# Patient Record
Sex: MALE | Race: BLACK OR AFRICAN AMERICAN | NOT HISPANIC OR LATINO | URBAN - METROPOLITAN AREA
[De-identification: names, ages, dates, MRNs, and addresses within clinical notes are randomized per-mention and may not be internally consistent; named-entity substitution may affect disease eponyms.]

---

## 2024-05-06 DIAGNOSIS — Z09 PSYCHIATRIC FOLLOW-UP: ICD-10-CM

## 2024-05-06 DIAGNOSIS — Z86.59 PSYCHIATRIC FOLLOW-UP: ICD-10-CM

## 2024-05-06 PROBLEM — F63.9 IMPULSE CONTROL DISORDER: Status: ACTIVE | Noted: 2024-05-06

## 2024-05-06 PROBLEM — F79 INTELLECTUAL DISABILITY: Status: ACTIVE | Noted: 2024-05-06

## 2024-05-20 ENCOUNTER — OFFICE VISIT (OUTPATIENT)
Dept: BEHAVIORAL HEALTH | Facility: CLINIC | Age: 24
End: 2024-05-20
Payer: COMMERCIAL

## 2024-05-20 DIAGNOSIS — Z86.59 HISTORY OF MOOD DISORDER: ICD-10-CM

## 2024-05-20 DIAGNOSIS — G47.9 SLEEP DISORDER: ICD-10-CM

## 2024-05-20 DIAGNOSIS — Z86.59 PSYCHIATRIC FOLLOW-UP: ICD-10-CM

## 2024-05-20 DIAGNOSIS — R47.9 DIFFICULTY USING VERBAL COMMUNICATION: ICD-10-CM

## 2024-05-20 DIAGNOSIS — Z01.89 ASSESSMENT OF EFFECTS OF PSYCHOTROPIC DRUG: ICD-10-CM

## 2024-05-20 DIAGNOSIS — T88.7XXA MEDICATION SIDE EFFECTS: ICD-10-CM

## 2024-05-20 DIAGNOSIS — Q90.9 DOWN SYNDROME (HHS-HCC): ICD-10-CM

## 2024-05-20 DIAGNOSIS — Z09 PSYCHIATRIC FOLLOW-UP: ICD-10-CM

## 2024-05-20 DIAGNOSIS — K59.09 CHRONIC CONSTIPATION: ICD-10-CM

## 2024-05-20 DIAGNOSIS — F79 INTELLECTUAL DISABILITY: ICD-10-CM

## 2024-05-20 DIAGNOSIS — R41.89 DIFFICULTY UNDERSTANDING VERBAL LANGUAGE: ICD-10-CM

## 2024-05-20 DIAGNOSIS — F29 ATYPICAL PSYCHOSIS (MULTI): ICD-10-CM

## 2024-05-20 DIAGNOSIS — Z79.899 ASSESSMENT OF EFFECTS OF PSYCHOTROPIC DRUG: ICD-10-CM

## 2024-05-20 DIAGNOSIS — Z91.89 AT RISK FOR SIDE EFFECT OF MEDICATION: ICD-10-CM

## 2024-05-20 PROCEDURE — 99215 OFFICE O/P EST HI 40 MIN: CPT | Performed by: STUDENT IN AN ORGANIZED HEALTH CARE EDUCATION/TRAINING PROGRAM

## 2024-05-20 NOTE — PATIENT INSTRUCTIONS
AFTER VISIT SUMMARY      Date: 5/20/2024  Appointment with Psychiatrist - Dr. Freedman      Reason for Visit:  -Routine follow-up/Medication management      Discussed during Appointment:   -Constipation  -Physical health, psychiatric/behavioral symptoms, daily functioning, new issues/concerns     -Treatment plan/management, need for yearly bloodwork  -Medication      Clinical Impression/Status Update:  Patient appears to be at his psychiatric and behavioral baseline. Enjoying day program. However, mother reports baseline constipation. Reports of frequent vomiting about 5 to 10 minutes after getting ducolax. Recommended that mother changes the formulation or get a different flavor. Recent combination for constipation is not optimal, therefore will increasing docusate from 100 mg daily to 100 mg bid. Will order yearly labs and follow up in 6 months.      #Clinic Policies/Procedures  -Refills  Prescriptions will be sent to your pharmacy with enough refills to last until your next appointment. For established patients, we typically provide 6 refills for regular meds and 3 refills for controlled substances (e.g., ADHD stimulant medication, benzodiazepines such as lorazepam). We will not provide additional refills beyond that without having an appointment. You can schedule an appointment by calling our office at [phone].     -Paperwork Requests (e.g., Expert Eval for guardianship)  We ask that you please schedule an appointment if needing paperwork filled out by the doctor (e.g., Expert Eval, FMLA form).  Please provide as much information as possible about your request and email the doctor any forms needing to be filled out prior to the appointment.       ===========================  INSTRUCTIONS/RECOMMENDATIONS  ===========================    #Medication   -Medication changes made today:   --We are increasing your docusate from 100 mg daily to 100 mg twice daily    >>For prior authorization issues at the pharmacy  -> Call the office and ask to talk to Nurse Reyes.      ** Include signing up for [employer] and help with technical issues    [employer] - Online Patient Portal   For Help activating your [employer] Account or setting up Proxy Access, please call the dedicated Patient Support Line at [phone].    #Technical Issues with Epic -> Please help us improve the Epic experience  To help identify issues needing to be fixed and improve patient care, please report any issues you experience with Epic or  [employer] such as difficulty connecting to video during virtual visits.  [phone]      #Follow-up  --Your next appointment is scheduled for 10/14/2024 at 3:00pm (virtual visit with [employer])    *If having new/worsening symptoms, please call the clinic ([phone]) to discuss being seen sooner   >>If unable to reach the office, send me an email at [email or web address]

## 2024-05-20 NOTE — PROGRESS NOTES
Others Attending Appointment:  -Mother  *Presence necessary as independent historian given patient's intellectual/developmental disability and/or impaired communication abilities      LAST INTERVENTION  Last seen in August 2023. Patient was at his psychiatric/behavioral baseline. Report of constipation. Reviewed constipation meds administration by mother. Recommended Senna and lactulose. Reviewed lab results and discussed Down Syndrome-related complications that can occur including Alzheimer's. No medication changes.      HISTORY OF PRESENT ILLNESS    #Interval Change  -Patient reported to be at about their psychiatric/behavioral baseline  -No new issues/concerns  -Patient attends day program ([facility] [employer]) at [place].         Behavior  -Stable. Behavior has remained at baseline per mother. There is no report of physical aggression, property destruction, elopement, or self-injurious behavior since last visit.   -Recall from prior: patient occasionally yells and cusses at mother. No more than a couple times per month. Mother reports belief that listening to music exacerbates patient’s yelling at unseen things.         -Psychosis  Stable. Rarely appears internally stimulated. Not daily or even weekly. As prior, patient does not endorse hallucinations. There is no evidence suggestive of delusions.       -Medication  -Endorses medication adherence.  -Denies side effects other than baseline constipation  -No report of signs/symptoms suggestive of oculogyric crisis since last visit; has been taking cogentin as a prophylaxis.  -No report of significant issues with excessive sedation, drooling, dizziness, tremors, rigidity, or shuffling gait.  -No report of significant change in frequency of use of PRNs for behaviors/agitation  -No report of significant change in effectiveness of PRNs for mitigating target behavior  -Chronic constipation discussed below      Health  -Mother reports concern for weight gain  though unclear if actual gain and if so how much  --Height: 5 ft. Weight: 125 pounds (5/20/2024)  No report of ED visits or hospitalizations since last visit.   No report of changes or issues with sleep or appetite.     -constipation which is reported to be same as usual. Mother reports that patient throws up about 5 or 10 minutes after she administers Ducolax tablets (about 80% of the time). Mother reported that she tried administering it before breakfast but that didn't help him either. Patient gets the stool softener (docusate) every night and ducolax on Wednesday and Saturday mornings around 8 am and ready to pass stool by the time he gets back from day program. Patient doesn't vomit after taking the stool softener. Other laxatives medications that have been used in the past include miralax.          REVIEW OF SYMPTOMS  -Depression: sometimes irritable   -Anxiety: negative  -ADHD: negative  -ASD: negative  -Psychosis: as per HPI  -ODD: negative  -OCD: negative  -Veda: negative  -Aggression: as per HPI  -SIB: as per HPI  -Sexually inappropriate behaviors: Will often pat females on the buttocks or the waist. Masturbates only in his room.   -Sleep: No issues reported.   -Appetite: No issues reported. No report of pica.   -Medical ROS: does not endorse difficulty urinating, seizures, rash, or polydipsia. +Constipation  *All other systems have been reviewed and are negative for complaint unless otherwise noted above         PAST PSYCHIATRIC HISTORY  -Prior diagnoses: Down Syndrome  -Was previously seeing someone at [facility] in June 2020; prior to that did not get psych care  -No report of prior psych hospitalizations  -History of violence: hitting, pushing, shoving, property destruction  -No reported history of self-injurious behavior  -History of self-injurious behavior: used to hit self in the head years ago  -No reported history of suicide attempt  -Previous psych meds: Abilify (not effective)    SOCIAL HISTORY:    -Lives with mother, father, aunt  -Has 7 siblings, all adult  -Family: significant involvement  -Care needs: 24/7 supervision, has an aide in the evenings to help family, assistance with ADLs, assistance with iADLS  -Work/School: graduated from high school. Goes to a vocational training center daily.  -Receives Board of Developmental Disabilities services in [facility] [place]  -Guardianship: own guardian   -Language/Communication: Poor verbal fluency, vocalizes with unclear meaning, limited vocabulary, poor language comprehension  -Cognitive abilities: No history of formal testing. Cannot read or write. No money management abilities.  -Does not endorse smoking, ETOH, or illicit drug use. No history of past substance abuse.  -No reported history of significant trauma   -No reported access to firearms     PAST MEDICAL HISTORY:   -No reported history of seizures  -No reported history of cardiac issues  -Prenatal exposure to crack cocaine  -Chronic constipation  -Down Syndrome  -Nystagmus  -PCP: Joe Keith MD  -Dentist: [facility]       FAMILY HISTORY  -Patient is adopted from his aunt  -Older brother with schizophrenia vs drug-induced 2/2 crack cocaine  -No reported family history of suicide  -Mother with substance abuse issues          Mental Status Exam:     Appearance: adequate grooming, dysmorphic features consistent with Down Syndrome  Build: average  Eye Contact: average, bilateral nystagmus as prior.   Motor Activity:  average without PMA/PMR.   Musculoskeletal: No TD, tics, or tremor appreciated. AIMS score 0.   Demeanor: average  Mood: euthymic  Affect: restricted  Speech: limited vocab, slow, slurred, hypophonic speech.  Thought Process: [place]. Generally goal directed. Associations are logical.  Thought Content: Does not endorse suicidal or homicidal ideation, no delusions elicited.   Thought Perception: Does not endorse auditory or visual hallucinations. Does not appear to be responding  to hallucinatory stimuli  Orientation: alert, oriented to self/situation  Attention/Concentration: normal  Cognition: moderate to severe intellectual disability  Insight: impaired, in setting of intellectual/developmental disability  Judgement: impaired, in setting of intellectual/developmental disability             RISK ASSESSMENT   Patient is currently felt to be at low acute and imminent risk of harm to self and others. However, chronic risk is elevated given history of violence including aggression towards others. Patient does not currently meet criteria for inpatient psychiatric hospitalization given that he does not exhibit evidence of significant deterioration in baseline psychiatric illness, aggression, self-injury, and ability to care for self. There is no evidence that patient’s current caregivers/living environment are unable to safely manage patient’s behavior. Overall risk mitigated by continued psychiatric follow-up care, psychopharmacologic intervention, 24/7 supervision, and [facility] services. Mother aware of emergency psychiatric resources available in the event of an acute psychiatric emergency, Mobile Crisis, 911, and local ER.      ===========================  IMPRESSION  Male with Down syndrome who initially presented for new eval of angry outbursts.     Initial Eval:  Patient’s outbursts do not appear to be behaviorally driven and there does not seem to be significant irritability involved. The setting in which outbursts occur, when talking out loud to unseen persons (often while alone) is concerning for a psychotic disorder and patient does have a family history of schizophrenia. Will start by increasing Risperidone to target possible psychosis. Mother has been using prn Ativan prescribed by the ED. Instead, will encourage use of Risperidone prn as the outbursts seem to occur in the setting of psychosis. I will provide a limited amount of Ativan for emergencies if Risperidone doesn’t  work.       ###  As of this appointment:  Patient appears to be at his psychiatric and behavioral baseline. No report of interim instances of oculogyric phenomenon. No report of new issues/concerns. Will continue psych meds and check yearly monitoring labs.     Report of vomiting associated with Ducolax and chronic constipation not improved from med recs at last visit. Will try to optimize constipation management with multifactorial approach. Provided recs for non-pharm interventions (e,g. Hydration), trying different formulations/flavoring. Will alsotry increasing docusate from 100 mg daily to 100 mg bid.    ###        Diagnoses:  -Unspecified psychotic disorder, r/o Schizophrenia  -Chronic constipation  -Sleep Disorder  -Mood disorder  -Down Syndrome  -Intellectual Disability      PLAN    *MED CHANGES*  Visit of high complexity given patient’s limited communication ability and intellectual disability, and the resulting need for the presence of a third party (family) to provide clinical information and history.     #Medication management  -Increase:  --Docusate from 100 mg daily to 100 mg bid    Continue:  --Risperidone 2mg bid  --Risperidone 2mg qday prn for agitation/psychosis   --Benztropine 1mg bid   --Fluoxetine 10mg daily   --Melatonin 3mg qhs   --Ativan 1mg qday, prn, as needed for severe agitation not responding to prn Ripseridone  --Valium PRN for labs      #Medication Considerations  -Medication consent/assent:   Risks, benefits, alternatives, off-label uses, black box warnings, and frequent/important side effects of medications have been discussed with patient/caregiver. To the extent possible, they have voiced understanding and agreement with recommended use of psychotropic medication.     -Medical necessity for continued treatment with psychotropic medication:      [] Symptom reduction        [] Improvement in functioning      [x] Reduce risk of harm to self/others      [x] Maintenance therapy to  prevent deterioration in functioning      -Rational for not reducing medication dose at this time:           [x] Significant risk of deterioration in functioning       [x] Concern for elevating risk of harm to self/others      [] Prior dose reduction unsuccessful and/or harmful      [] Psychiatric/behavioral condition not adequately stabilized/optimized       [] Medication regimen recently modified          -OARRS  --I have personally reviewed patient's OARRS report. I have considered the risks of abuse, dependence, addiction, and diversion and feel that the potential benefits of treatment with a controlled substance currently outweigh the potential risks.      -Risk/Benefit assessment:  There is no report of signs/symptoms consistent with medication-induced impairment in daily functioning. At this time, benefits of medication felt to outweigh potential risks. But we will continue to reassess need for psychotropic medication at regular 3 to 6 month intervals, or sooner as clinically indicated.         #Medication Monitoring Plan:    -Labs due May 2024; orders placed   -Labs to monitor: CBC, CMP, TSH/T4, lipid panel, Hgb A1c       #MDM/Complexity Issues    [] Chronic medical comorbidities     [x] Chronic risk of harm to self/others     [x] Intellectual/Developmental disability     [x] Need for independent historian due to intellectual/developmental disability and/or           impaired communication abilities     [x] Controlled substance medication requiring regular monitoring     [x] Medication requiring significant ongoing monitoring for toxicity     #Counseling Provided     [x] Diagnostic impression/prognosis      [x] Risks and benefits of treatment options     [x] Instruction for management/treatment and follow-up     [x] Educated patient/caregiver about: behavioral interventions, sleep hygiene, safety planning                  #Coordination of care provided    [x] Family    [] Caregiver/DSP/Staff       []Agency  supervisor       [] Nursing staff      [] SSA/          []Therapist                     [] Guardian       #Follow-up      -in about 6 months, or sooner if new/worsening symptoms         >> Scheduled virtual Follow-up Appt on 10/14/2024 at 15:00     Time  -Prep time on date of the patient encounter: 10 minutes  -Time spent directly with patient/family/caregiver: 45 minutes  -Additional time spent on patient care activities: 10 minutes  -Documentation time: 10 minutes  -Total time on date of patient encounter: 75 minutes        Scribe Attestation  By signing my name below, I, Rosalee MccoyJD [facility] , Scribe   attest that this documentation has been prepared under the direction and in the presence of Bonita Freedman DO.

## 2024-05-24 RX ORDER — BENZTROPINE MESYLATE 1 MG/1
1 TABLET ORAL 2 TIMES DAILY
Status: CANCELLED | OUTPATIENT
Start: 2024-05-24

## 2024-05-24 RX ORDER — FLUOXETINE 10 MG/1
10 TABLET ORAL
COMMUNITY
End: 2024-05-28 | Stop reason: SDUPTHER

## 2024-05-24 RX ORDER — DOCUSATE SODIUM 100 MG/1
100 CAPSULE, LIQUID FILLED ORAL DAILY
COMMUNITY
Start: 2024-03-19 | End: 2024-05-28 | Stop reason: SDUPTHER

## 2024-05-24 RX ORDER — DOCUSATE SODIUM 100 MG/1
100 CAPSULE, LIQUID FILLED ORAL DAILY
Qty: 60 CAPSULE | Status: CANCELLED | OUTPATIENT
Start: 2024-05-24

## 2024-05-24 RX ORDER — TALC
POWDER (GRAM) TOPICAL
Status: CANCELLED | OUTPATIENT
Start: 2024-05-24

## 2024-05-24 RX ORDER — LORAZEPAM 1 MG/1
1 TABLET ORAL DAILY PRN
COMMUNITY
Start: 2020-10-04

## 2024-05-24 RX ORDER — FLUOXETINE 10 MG/1
10 TABLET ORAL
Status: CANCELLED | OUTPATIENT
Start: 2024-05-24

## 2024-05-24 RX ORDER — TALC
POWDER (GRAM) TOPICAL
COMMUNITY
End: 2024-05-28 | Stop reason: SDUPTHER

## 2024-05-24 RX ORDER — RISPERIDONE 2 MG/1
TABLET ORAL
Status: CANCELLED | OUTPATIENT
Start: 2024-05-24

## 2024-05-24 RX ORDER — BENZTROPINE MESYLATE 1 MG/1
1 TABLET ORAL 2 TIMES DAILY
COMMUNITY
End: 2024-05-28 | Stop reason: SDUPTHER

## 2024-05-24 RX ORDER — LORAZEPAM 1 MG/1
1 TABLET ORAL DAILY PRN
Status: CANCELLED | OUTPATIENT
Start: 2024-05-24

## 2024-05-24 RX ORDER — RISPERIDONE 2 MG/1
TABLET ORAL
COMMUNITY
End: 2024-05-28 | Stop reason: SDUPTHER

## 2024-05-28 DIAGNOSIS — Z91.89 AT RISK FOR SIDE EFFECT OF MEDICATION: ICD-10-CM

## 2024-05-28 DIAGNOSIS — F29 PSYCHOSIS, UNSPECIFIED PSYCHOSIS TYPE (MULTI): Primary | ICD-10-CM

## 2024-05-28 DIAGNOSIS — G47.9 SLEEP DISORDER: ICD-10-CM

## 2024-05-28 DIAGNOSIS — F63.9 IMPULSE CONTROL DISORDER: ICD-10-CM

## 2024-05-28 RX ORDER — TALC
POWDER (GRAM) TOPICAL
Qty: 30 TABLET | Refills: 6 | Status: SHIPPED | OUTPATIENT
Start: 2024-05-28

## 2024-05-28 RX ORDER — FLUOXETINE 10 MG/1
TABLET ORAL
Qty: 30 TABLET | Refills: 6 | Status: SHIPPED | OUTPATIENT
Start: 2024-05-28

## 2024-05-28 RX ORDER — DOCUSATE SODIUM 100 MG/1
CAPSULE, LIQUID FILLED ORAL
Qty: 60 CAPSULE | Refills: 6 | Status: SHIPPED | OUTPATIENT
Start: 2024-05-28

## 2024-05-28 RX ORDER — BENZTROPINE MESYLATE 1 MG/1
TABLET ORAL
Qty: 60 TABLET | Refills: 6 | Status: SHIPPED | OUTPATIENT
Start: 2024-05-28

## 2024-05-28 RX ORDER — RISPERIDONE 2 MG/1
TABLET ORAL
Qty: 90 TABLET | Refills: 6 | Status: SHIPPED | OUTPATIENT
Start: 2024-05-28

## 2024-05-29 PROBLEM — Z78.9: Status: ACTIVE | Noted: 2024-05-29

## 2024-05-29 PROBLEM — Z01.89 ASSESSMENT OF EFFECTS OF PSYCHOTROPIC DRUG IN PATIENT AT RISK FOR METABOLIC SYNDROME: Status: ACTIVE | Noted: 2024-05-29

## 2024-05-29 PROBLEM — Z79.899 ASSESSMENT OF EFFECTS OF PSYCHOTROPIC DRUG IN PATIENT AT RISK FOR METABOLIC SYNDROME: Status: ACTIVE | Noted: 2024-05-29

## 2024-05-29 PROBLEM — R47.9 DIFFICULTY USING VERBAL COMMUNICATION: Status: ACTIVE | Noted: 2024-05-29

## 2024-05-29 PROBLEM — Z91.89 AT RISK FOR SIDE EFFECT OF MEDICATION: Status: ACTIVE | Noted: 2024-05-29

## 2024-05-29 PROBLEM — R68.89: Status: ACTIVE | Noted: 2024-05-29

## 2024-05-29 PROBLEM — F29 ATYPICAL PSYCHOSIS (MULTI): Status: ACTIVE | Noted: 2024-05-29

## 2024-05-29 PROBLEM — Z86.59 HISTORY OF MOOD DISORDER: Status: ACTIVE | Noted: 2024-05-29

## 2024-05-29 PROBLEM — K59.09 CHRONIC CONSTIPATION: Status: ACTIVE | Noted: 2024-05-29

## 2024-05-29 PROBLEM — T88.7XXA MEDICATION SIDE EFFECTS: Status: ACTIVE | Noted: 2024-05-29

## 2024-05-29 PROBLEM — R41.89 DIFFICULTY UNDERSTANDING VERBAL LANGUAGE: Status: ACTIVE | Noted: 2024-05-29

## 2024-05-29 PROBLEM — G47.9 SLEEP DISORDER: Status: ACTIVE | Noted: 2024-05-29

## 2024-05-29 PROBLEM — Q90.9 DOWN SYNDROME (HHS-HCC): Status: ACTIVE | Noted: 2024-05-29

## 2024-10-11 NOTE — PROGRESS NOTES
Others Attending Appointment:  -Mother  *Presence necessary as independent historian given patient's intellectual/developmental disability and/or impaired communication abilities      LAST INTERVENTION  Last seen about 5 months ago in May 2024. Patient was at his psychiatric/behavioral baseline. Report of constipation. Provided recs for non-pharm interventions (e,g. Hydration) and trying different formulations/flavoring. Increased docusate from 100 mg daily to 100 mg bid.          HISTORY OF PRESENT ILLNESS    #Interval Change  -Report of worse behavioral issues made worse by constipation        -Behavior  -Worse  -Report of recent behavioral outbursts (at day program > home) involving yelling, cussing and throwing stuff. Somewhat morefrequent than baseline,mum reports belief these happen when constipated.   There is no report of physical aggression, property destruction, elopement, or self-injurious behavior since last visit.   -Recall from prior: patient occasionally yells and cusses at mother. No more than a couple times per month.  Mother reports triggers include being told no or told what to do.  Mother reports belief that listening to music exacerbates patient's yelling at unseen things.     -Psychosis  Stable  Rarely appears internally stimulated. Not daily or even weekly. As prior, patient does not endorse hallucinations. There is no evidence suggestive of delusions.     -Medication  -Endorses medication adherence.  -Denies side effects other than baseline constipation  -No report of signs/symptoms suggestive of oculogyric crisis since last visit; has been taking cogentin as a prophylaxis.  -No report of significant issues with excessive sedation, drooling, dizziness, tremors, rigidity, or shuffling gait.  -No report of significant change in frequency of use of PRNs for behaviors/agitation  -No report of significant change in effectiveness of PRNs for mitigating target behavior  -Chronic constipation  "discussed below      Health  Report of constipation. Mother reports that he defecates once every week and she would want him \"to go\" every morning. Mother reports that patient gets the stool softeners twice a day.  No report of ED visits or hospitalizations since last visit.   No report of changes or issues with sleep or appetite.   Recall from prior: Mother reports concern for weight gain though unclear if actual gain and if so how much  --Height: 5 ft. Weight: 125 pounds (5/20/2024)  -Mother reports that patient throws up about 5 or 10 minutes after she administers Ducolax tablets (about 80% of the time). Mother reported that she tried administering it before breakfast but that didn't help him either. Patient gets the stool softener (docusate) every night and ducolax on Wednesday and Saturday mornings around 8 am and ready to pass stool by the time he gets back from day program. Patient doesn't vomit after taking the stool softener. Other laxatives medications that have been used in the past include miralax.      REVIEW OF SYMPTOMS  -Depression: sometimes irritable   -Anxiety: negative  -ADHD: negative  -ASD: negative  -Psychosis: as per HPI  -ODD: negative  -OCD: negative  -Veda: negative  -Aggression: as per HPI  -SIB: as per HPI  -Sexually inappropriate behaviors: Will often pat females on the buttocks or the waist. Masturbates only in his room.   -Sleep: No issues reported.   -Appetite: No issues reported. No report of pica.   -Medical ROS: does not endorse difficulty urinating, seizures, rash, or polydipsia. +Constipation  *All other systems have been reviewed and are negative for complaint unless otherwise noted above         PAST PSYCHIATRIC HISTORY  -Prior diagnoses: Down Syndrome  -Was previously seeing someone at [facility] in June 2020; prior to that did not get psych care  -No report of prior psych hospitalizations  -History of violence: hitting, pushing, shoving, property destruction  -No reported " history of self-injurious behavior  -History of self-injurious behavior: used to hit self in the head years ago  -No reported history of suicide attempt  -Previous psych meds: Abilify (not effective)    SOCIAL HISTORY:   -Lives with mother, father, aunt  -Has 7 siblings, all adult  -Family: significant involvement  -Care needs: 24/7 supervision, has an aide in the evenings to help family, assistance with ADLs, assistance with iADLS  -Work/School: graduated from high school. Goes to a vocational training center daily.  -Receives Board of Developmental Disabilities services in [facility] [place]  -Guardianship: own guardian   -Language/Communication: Poor verbal fluency, vocalizes with unclear meaning, limited vocabulary, poor language comprehension  -Cognitive abilities: No history of formal testing. Cannot read or write. No money management abilities.  -Does not endorse smoking, ETOH, or illicit drug use. No history of past substance abuse.  -No reported history of significant trauma   -No reported access to firearms     PAST MEDICAL HISTORY:   -No reported history of seizures  -No reported history of cardiac issues  -Prenatal exposure to crack cocaine  -Chronic constipation  -Down Syndrome  -Nystagmus  -PCP: Joe Keith MD  -Dentist: [facility]       FAMILY HISTORY  -Patient is adopted from his aunt  -Older brother with schizophrenia vs drug-induced 2/2 crack cocaine  -No reported family history of suicide  -Mother with substance abuse issues      Mental Status Exam:     Appearance: adequate grooming, dysmorphic features consistent with Down Syndrome  Build: average  Eye Contact: average, bilateral nystagmus as prior.   Motor Activity:  average without PMA/PMR.   Musculoskeletal: No TD, tics, or tremor appreciated. AIMS score 0.   Demeanor: average  Mood: euthymic  Affect: restricted  Speech: limited vocab, slow, slurred, hypophonic speech.  Thought Process: [place]. Generally goal directed.  Associations are logical.  Thought Content: Does not endorse suicidal or homicidal ideation, no delusions elicited.   Thought Perception: Does not endorse auditory or visual hallucinations. Does not appear to be responding to hallucinatory stimuli  Orientation: alert, oriented to self/situation  Attention/Concentration: normal  Cognition: moderate to severe intellectual disability  Insight: impaired, in setting of intellectual/developmental disability  Judgement: impaired, in setting of intellectual/developmental disability       RISK ASSESSMENT   Patient is currently felt to be at low acute and imminent risk of harm to self and others. However, chronic risk is elevated given history of violence including aggression towards others. Patient does not currently meet criteria for inpatient psychiatric hospitalization given that he does not exhibit evidence of significant deterioration in baseline psychiatric illness, aggression, self-injury, and ability to care for self. There is no evidence that patient's current caregivers/living environment are unable to safely manage patient's behavior. Overall risk mitigated by continued psychiatric follow-up care, psychopharmacologic intervention, 24/7 supervision, and [facility] services. Mother aware of emergency psychiatric resources available in the event of an acute psychiatric emergency, Mobile Crisis, 911, and local ER.      ===========================  IMPRESSION  Male with Down syndrome who initially presented for new eval of angry outbursts.     Initial Eval:  Patient's outbursts do not appear to be behaviorally driven and there does not seem to be significant irritability involved. The setting in which outbursts occur, when talking out loud to unseen persons (often while alone) is concerning for a psychotic disorder and patient does have a family history of schizophrenia. Will start by increasing Risperidone to target possible psychosis. Mother has been using prn  Ativan prescribed by the ED. Instead, will encourage use of Risperidone prn as the outbursts seem to occur in the setting of psychosis. I will provide a limited amount of Ativan for emergencies if Risperidone doesn't work.       ###  AS OF THIS APPOINTMENT:  -Patient's overall psychiatric and behavioral condition worse since last visit, notably report of worse behavioral issues that mum notes seem to be made worse by constipation. Discussed behavioral strategies in addition to plan for addressing chronic constipation.   -Otherwise, no report of significant new issues/concerns    -Patient due for labs. Labs ordered during prior visit. . Will continue to remind caregiver to get labs done.   -Patient appears to be tolerating medication regimen without report of significant new or bothersome side effects aside from chronic constipation,  -Will start daily scheduled lactulose for constipation.  --Additionally, discussed a number of strategies and apps for mum to use to improve constipation  --Mother requested renewing PRN Ativan. Discussed risk and benefits of using benzodiazepines and that medication is to be used in emergency situations only.   -Will plan for follow-up in about 4 to 6 weeks   ###      Diagnoses:  -Unspecified psychotic disorder, r/o Schizophrenia  -Chronic constipation  -Sleep Disorder  -Mood disorder  -Down Syndrome  -Intellectual Disability      PLAN / MANAGEMENT / RECOMMENDATIONS        >>MED CHANGES<<     Visit of high complexity given patient's limited communication ability and intellectual disability, and the resulting need for the presence of a third party (family) to provide clinical information and history.     #Medication management  -Start scheduled Lactulose 15 ml daily  -Renew PRN Ativan 1 mg prn for agitation  -Continue:  --Risperidone 2mg bid  --Risperidone 2mg qday prn for agitation/psychosis   --Benztropine 1mg bid   --Fluoxetine 10mg daily   --Melatonin 3mg qhs   --Valium PRN for  labs  --Docusate 100 mg bid      #Medication Considerations  -Medication consent/assent:   Risks, benefits, alternatives, off-label uses, black box warnings, and frequent/important side effects of medications have been discussed with patient/caregiver. To the extent possible, they have voiced understanding and agreement with recommended use of psychotropic medication.     -Medical necessity for continued treatment with psychotropic medication:      [] Symptom reduction        [] Improvement in functioning      [x] Reduce risk of harm to self/others      [x] Maintenance therapy to prevent deterioration in functioning      -Rational for not reducing medication dose at this time:           [x] Significant risk of deterioration in functioning       [x] Concern for elevating risk of harm to self/others      [] Prior dose reduction unsuccessful and/or harmful      [] Psychiatric/behavioral condition not adequately stabilized/optimized       [] Medication regimen recently modified          -OARRS  --I have personally reviewed patient's OARRS report. I have considered the risks of abuse, dependence, addiction, and diversion and feel that the potential benefits of treatment with a controlled substance currently outweigh the potential risks.      -Risk/Benefit assessment:  There is no report of signs/symptoms consistent with medication-induced impairment in daily functioning. At this time, benefits of medication felt to outweigh potential risks. But we will continue to reassess need for psychotropic medication at regular 3 to 6 month intervals, or sooner as clinically indicated.         #Medication Monitoring Plan:    -Labs due May 2024; orders placed   -Labs to monitor: CBC, CMP, TSH/T4, lipid panel, Hgb A1c       #MDM/Complexity Issues    [] Chronic medical comorbidities     [x] Chronic risk of harm to self/others     [x] Intellectual/Developmental disability     [x] Need for independent historian due to  intellectual/developmental disability and/or           impaired communication abilities     [x] Controlled substance medication requiring regular monitoring     [x] Medication requiring significant ongoing monitoring for toxicity     #Counseling Provided     [x] Diagnostic impression/prognosis      [x] Risks and benefits of treatment options     [x] Instruction for management/treatment and follow-up     [x] Educated patient/caregiver about: behavioral interventions, sleep hygiene, safety planning                  #Coordination of care provided    [x] Family    [] Caregiver/DSP/Staff       []Agency supervisor       [] Nursing staff      [] SSA/          []Therapist                     [] Guardian       #Follow-up      -in about 4 to 6 weeks, or sooner if new/worsening symptoms         >> Scheduled virtual Follow-up Appt on 11/12/2024 at 16:00     Time  -Prep time on date of the patient encounter: 10 minutes  -Time spent directly with patient/family/caregiver: 45 minutes  -Additional time spent on patient care activities: 10 minutes  -Documentation time: 10 minutes  -Total time on date of patient encounter: 75 minutes        Scribe Attestation  By signing my name below, IRosalee, Scribe   attest that this documentation has been prepared under the direction and in the presence of Bonita Freedman DO.

## 2024-10-14 ENCOUNTER — APPOINTMENT (OUTPATIENT)
Dept: BEHAVIORAL HEALTH | Facility: CLINIC | Age: 24
End: 2024-10-14
Payer: COMMERCIAL

## 2024-10-14 DIAGNOSIS — Z86.59 PSYCHIATRIC FOLLOW-UP: ICD-10-CM

## 2024-10-14 DIAGNOSIS — Z09 PSYCHIATRIC FOLLOW-UP: ICD-10-CM

## 2024-10-14 DIAGNOSIS — Q90.9 DOWN SYNDROME: ICD-10-CM

## 2024-10-14 DIAGNOSIS — F51.05 INSOMNIA RELATED TO ANOTHER MENTAL DISORDER: ICD-10-CM

## 2024-10-14 DIAGNOSIS — F63.9 IMPULSE CONTROL DISORDER: ICD-10-CM

## 2024-10-14 DIAGNOSIS — F79 INTELLECTUAL DISABILITY: ICD-10-CM

## 2024-10-14 DIAGNOSIS — Z91.89 AT RISK FOR COPING DIFFICULTY: ICD-10-CM

## 2024-10-14 DIAGNOSIS — R45.1 AGITATION: ICD-10-CM

## 2024-10-14 DIAGNOSIS — R47.9 DIFFICULTY USING VERBAL COMMUNICATION: ICD-10-CM

## 2024-10-14 DIAGNOSIS — F29 ATYPICAL PSYCHOSIS: ICD-10-CM

## 2024-10-14 DIAGNOSIS — R41.89 DIFFICULTY UNDERSTANDING VERBAL LANGUAGE: ICD-10-CM

## 2024-10-14 DIAGNOSIS — K59.09 CHRONIC CONSTIPATION: ICD-10-CM

## 2024-10-14 DIAGNOSIS — F39 MOOD DISORDER (CMS-HCC): ICD-10-CM

## 2024-10-14 PROCEDURE — 99215 OFFICE O/P EST HI 40 MIN: CPT | Performed by: STUDENT IN AN ORGANIZED HEALTH CARE EDUCATION/TRAINING PROGRAM

## 2024-10-14 NOTE — PATIENT INSTRUCTIONS
AFTER VISIT SUMMARY      Date: 10/14/2024  Appointment with Psychiatrist - Dr. Freedman      Reason for Visit:  -Routine follow-up/Medication management      Discussed during Appointment:   -Physical health, psychiatric/behavioral symptoms, daily functioning, new issues/concerns     -Treatment plan/management, including medication        Clinical Impression/Status Update:  Challenging behavior reported usually in response to being told what to do or being told no. Discussed behavioral strategies such as giving warning/setting timers for transitions at bed time for instance. Mother requested Ativan which he hasn't been on for over a year. Discussed risk and benefits of using benzodiazepines and that medication is to be used in emergency situations only.   Patient reportedly having only one bowel movement per week. Encouraged mom to download the Happy poop anton to track bowel movement and set aside a time of day for consistent timing for pooping. Will start lactulose.   Patient due for labs. Labs have been ordered. Will continue to remind caregiver to get labs done.   --We will continue current medications and see him again in 1 month.      #Clinic Policies/Procedures  -Refills  Prescriptions will be sent to your pharmacy with enough refills to last until your next appointment. For established patients, we typically provide 6 refills for regular meds and 3 refills for controlled substances (e.g., ADHD stimulant medication, benzodiazepines such as lorazepam). We will not provide additional refills beyond that without having an appointment. You can schedule an appointment by sending a [employer] message or calling our office at [phone].     -Paperwork Requests (e.g., Expert Eval for guardianship)  We ask that you please schedule an appointment if needing paperwork filled out by the doctor (e.g., Expert Eval, FMLA form).  Please provide as much information as possible about your request and email the doctor any forms  needing to be filled out prior to the appointment.       ===========================  INSTRUCTIONS/RECOMMENDATIONS  ===========================    #Medication   -Medication changes made today:   --We are starting you on lactulose 15 ml daily  --We are restarting you on Ativan 1 mg for severe agitation. Take half a tablet, if ineffective after 45 minutes, use another half.      >>For prior authorization issues at the pharmacy -> Call the office and ask to talk to Nurse Reyes.         [employer] - Online Patient Portal   For Help activating your [employer] Account or setting up Proxy Access, please call the dedicated Patient Support Line at [phone].    If having technical Issues with Epic  or [employer]-> Please help us improve the Epic experience  To help identify issues needing to be fixed and improve patient care, please report any issues you experience with Epic or  [employer], such as difficulty connecting to video during virtual visits.  [phone]      #Follow-up  --Your next appointment is scheduled for 11/12/2024 at 4:00 pm (virtual visit with [employer])    *If having new/worsening symptoms, please send a [employer] message or call the clinic ([phone]) to discuss being seen sooner   >>If unable to reach the office, send me an email at [email or web address]

## 2024-10-17 ENCOUNTER — TELEPHONE (OUTPATIENT)
Dept: BEHAVIORAL HEALTH | Facility: CLINIC | Age: 24
End: 2024-10-17
Payer: COMMERCIAL

## 2024-11-12 ENCOUNTER — APPOINTMENT (OUTPATIENT)
Dept: BEHAVIORAL HEALTH | Facility: CLINIC | Age: 24
End: 2024-11-12
Payer: COMMERCIAL

## 2024-11-12 DIAGNOSIS — R47.9 DIFFICULTY USING VERBAL COMMUNICATION: ICD-10-CM

## 2024-11-12 DIAGNOSIS — T50.905A DRUG-INDUCED EXTRAPYRAMIDAL MOVEMENT DISORDER: ICD-10-CM

## 2024-11-12 DIAGNOSIS — F79 INTELLECTUAL DISABILITY: ICD-10-CM

## 2024-11-12 DIAGNOSIS — Z86.59 PSYCHIATRIC FOLLOW-UP: ICD-10-CM

## 2024-11-12 DIAGNOSIS — G25.89 DRUG-INDUCED EXTRAPYRAMIDAL MOVEMENT DISORDER: ICD-10-CM

## 2024-11-12 DIAGNOSIS — Z91.89 AT RISK FOR SIDE EFFECT OF MEDICATION: ICD-10-CM

## 2024-11-12 DIAGNOSIS — Z09 PSYCHIATRIC FOLLOW-UP: ICD-10-CM

## 2024-11-12 DIAGNOSIS — F63.9 IMPULSE CONTROL DISORDER: ICD-10-CM

## 2024-11-12 DIAGNOSIS — Q90.9 DOWN SYNDROME: ICD-10-CM

## 2024-11-12 DIAGNOSIS — F51.05 INSOMNIA RELATED TO ANOTHER MENTAL DISORDER: ICD-10-CM

## 2024-11-12 DIAGNOSIS — F39 MOOD DISORDER (CMS-HCC): ICD-10-CM

## 2024-11-12 DIAGNOSIS — F29 ATYPICAL PSYCHOSIS (MULTI): ICD-10-CM

## 2024-11-12 DIAGNOSIS — K59.09 CHRONIC CONSTIPATION: ICD-10-CM

## 2024-11-12 DIAGNOSIS — R45.1 AGITATION: ICD-10-CM

## 2024-11-12 DIAGNOSIS — R41.89 DIFFICULTY UNDERSTANDING VERBAL LANGUAGE: ICD-10-CM

## 2024-11-12 DIAGNOSIS — Z91.89 AT RISK FOR COPING DIFFICULTY: ICD-10-CM

## 2024-11-12 PROCEDURE — 99215 OFFICE O/P EST HI 40 MIN: CPT | Performed by: STUDENT IN AN ORGANIZED HEALTH CARE EDUCATION/TRAINING PROGRAM

## 2024-11-12 PROCEDURE — 1036F TOBACCO NON-USER: CPT | Performed by: STUDENT IN AN ORGANIZED HEALTH CARE EDUCATION/TRAINING PROGRAM

## 2024-11-12 NOTE — PROGRESS NOTES
Others Attending Appointment:  -Mother  *Presence necessary as independent historian given patient's intellectual/developmental disability and/or impaired communication abilities      LAST INTERVENTION  Last seen about 4 weeks ago in October 2024. Report of challenging behavior. Discussed behavioral strategies to manage behavior. Mother requested Ativan. Discussed risk and benefits of using benzodiazepines and that medication is to be used in emergency situations only. Patient reportedly having only one bowel movement per week. Encouraged mom to download the Happy poop anton to track bowel movement and set aside a time of day for consistent timing for pooping. Patient was started on lactulose 15 ml daily. Labs were ordered.       HISTORY OF PRESENT ILLNESS  #Interval Change  -Report of challenging behavior  -Report of persistent constipation. Mom wants to time patient's bowel movement to 6 pm    -Behavior  Stable. Report of some behavioral outbursts and talking to imaginary people. Mother reports that she knows when patient is about to have an outburst because his lips will be moving with no sound. Mother reports that he is not physically aggressive.  There is no report of physical aggression, property destruction, elopement, or self-injurious behavior since last visit.   Recall from prior: Report of behavioral outbursts at day program than at home. Patient reportedly yelling, cussing and throwing stuff. Mother reports triggers include being told no or told what to do. An example is being told to go take a shower or go to bed.  -Recall from prior: patient occasionally yells and cusses at mother. No more than a couple times per month. Mother reports belief that listening to music exacerbates patient's yelling at unseen things.     -Psychosis  Stable  Rarely appears internally stimulated. Not daily or even weekly. As prior, patient does not endorse hallucinations. There is no evidence suggestive of delusions.  "    -Medication  -Endorses medication adherence.  -Denies side effects other than baseline constipation  -No report of signs/symptoms suggestive of oculogyric crisis since last visit; has been taking cogentin as a prophylaxis.  -No report of significant issues with excessive sedation, drooling, dizziness, tremors, rigidity, or shuffling gait.  -No report of significant change in frequency of use of PRNs for behaviors/agitation  -No report of significant change in effectiveness of PRNs for mitigating target behavior  -Chronic constipation discussed below  *Mother administers Dulcolax pills (3 pills) as needed. Patient reportedly throws up within 30 minutes. Docusate (100 mg) is given twice a day.    Health  Report of persistent constipation and has only pooped twice since last visit. Patient reportedly does not take flavored drinks.  No report of ED visits or hospitalizations since last visit.   No report of changes or issues with sleep or appetite.   Recall from prior: Report of constipation. Mother reports that he defecates once every week and she would want him \"to go\" every morning. Mother reports that patient gets the stool softeners twice a day.  Recall from prior: Mother reports concern for weight gain though unclear if actual gain and if so how much  --Height: 5 ft. Weight: 125 pounds (5/20/2024)  -constipation which is reported to be same as usual. Mother reports that patient throws up about 5 or 10 minutes after she administers Ducolax tablets (about 80% of the time). Mother reported that she tried administering it before breakfast but that didn't help him either. Patient gets the stool softener (docusate) every night and ducolax on Wednesday and Saturday mornings around 8 am and ready to pass stool by the time he gets back from day program. Patient doesn't vomit after taking the stool softener. Other laxatives medications that have been used in the past include miralax.      REVIEW OF " SYMPTOMS  -Depression: sometimes irritable   -Anxiety: negative  -ADHD: negative  -ASD: negative  -Psychosis: as per HPI  -ODD: negative  -OCD: negative  -Veda: negative  -Aggression: as per HPI  -SIB: as per HPI  -Sexually inappropriate behaviors: Will often pat females on the buttocks or the waist. Masturbates only in his room.   -Sleep: No issues reported.   -Appetite: No issues reported. No report of pica.   -Medical ROS: does not endorse difficulty urinating, seizures, rash, or polydipsia. +Constipation  *All other systems have been reviewed and are negative for complaint unless otherwise noted above         PAST PSYCHIATRIC HISTORY  -Prior diagnoses: Down Syndrome  -Was previously seeing someone at [facility] in June 2020; prior to that did not get psych care  -No report of prior psych hospitalizations  -History of violence: hitting, pushing, shoving, property destruction  -No reported history of self-injurious behavior  -History of self-injurious behavior: used to hit self in the head years ago  -No reported history of suicide attempt  -Previous psych meds: Abilify (not effective)    SOCIAL HISTORY:   -Lives with mother, father, aunt  -Has 7 siblings, all adult  -Family: significant involvement  -Care needs: 24/7 supervision, has an aide in the evenings to help family, assistance with ADLs, assistance with iADLS  -Work/School: graduated from high school. Goes to a vocational training center daily.  -Receives Board of Developmental Disabilities services in [facility]  -Guardianship: own guardian   -Language/Communication: Poor verbal fluency, vocalizes with unclear meaning, limited vocabulary, poor language comprehension  -Cognitive abilities: No history of formal testing. Cannot read or write. No money management abilities.  -Does not endorse smoking, ETOH, or illicit drug use. No history of past substance abuse.  -No reported history of significant trauma   -No reported access to  firearms     PAST MEDICAL HISTORY:   -No reported history of seizures  -No reported history of cardiac issues  -Prenatal exposure to crack cocaine  -Chronic constipation  -Down Syndrome  -Nystagmus  -PCP: Joe Keith MD  -Dentist: [facility]       FAMILY HISTORY  -Patient is adopted from his aunt  -Older brother with schizophrenia vs drug-induced 2/2 crack cocaine  -No reported family history of suicide  -Mother with substance abuse issues      Mental Status Exam:     Appearance: adequate grooming, dysmorphic features consistent with Down Syndrome  Build: average  Eye Contact: average, bilateral nystagmus as prior.   Motor Activity:  average without PMA/PMR.   Musculoskeletal: No TD, tics, or tremor appreciated. AIMS score 0.   Demeanor: average  Mood: euthymic  Affect: restricted  Speech: limited vocab, slow, slurred, hypophonic speech.  Thought Process: [place]. Generally goal directed. Associations are logical.  Thought Content: Does not endorse suicidal or homicidal ideation, no delusions elicited.   Thought Perception: Does not endorse auditory or visual hallucinations. Does not appear to be responding to hallucinatory stimuli  Orientation: alert, oriented to self/situation  Attention/Concentration: normal  Cognition: moderate to severe intellectual disability  Insight: impaired, in setting of intellectual/developmental disability  Judgement: impaired, in setting of intellectual/developmental disability       RISK ASSESSMENT   Patient is currently felt to be at low acute and imminent risk of harm to self and others. However, chronic risk is elevated given history of violence including aggression towards others. Patient does not currently meet criteria for inpatient psychiatric hospitalization given that he does not exhibit evidence of significant deterioration in baseline psychiatric illness, aggression, self-injury, and ability to care for self. There is no evidence that patient's current caregivers/living  "environment are unable to safely manage patient's behavior. Overall risk mitigated by continued psychiatric follow-up care, psychopharmacologic intervention, 24/7 supervision, and [facility] [place]. Mother aware of emergency psychiatric resources available in the event of an acute psychiatric emergency, Mobile Crisis, 911, and local ER.      ===========================  IMPRESSION  Male with Down syndrome who initially presented for new eval of angry outbursts.     Initial Eval:  Patient's outbursts do not appear to be behaviorally driven and there does not seem to be significant irritability involved. The setting in which outbursts occur, when talking out loud to unseen persons (often while alone) is concerning for a psychotic disorder and patient does have a family history of schizophrenia. Will start by increasing Risperidone to target possible psychosis. Mother has been using prn Ativan prescribed by the ED. Instead, will encourage use of Risperidone prn as the outbursts seem to occur in the setting of psychosis. I will provide a limited amount of Ativan for emergencies if Risperidone doesn't work.       ###      As of this appointment:  -Patient appears to have remained psychiatrically and behaviorally stable since last visit.   -Report of ongoing chronic constipation, unimproved with scheduled bid docusate and mother's unclear report of prn use of \"ducolax\" verified to consist of bisacodyl. As prior, behavioral outbursts appear more likely when significantly constipated. -Otherwise, no report of significant new issues/concerns.   -Recommended additional strategies such as increasing hydration, Happy Poop Tessa, trying to sit on the toilet around same time daily for at least ~10 minutes even when patient not feeling like needing to go in order to promote more consistent bowel habits. Recommended mum stop Ducolax (bisacodyl) for now and will try adding daily scheduled Lactulose instead.   -Will continue rest " of meds unchanged given tolerating medication regimen without report of significant or bothersome side effects other than chronic constipation which is likely in part medication-related.  -Reminded parent that patient due for yearly monitoring labs. Discussed rational and need for fasting labs.   -Will plan to follow up in 1 month.  ###        Diagnoses:  -Unspecified psychotic disorder, r/o Schizophrenia  -Chronic constipation  -Sleep Disorder  -Mood disorder  -Down Syndrome  -Intellectual Disability      PLAN / MANAGEMENT / RECOMMENDATIONS        >>MED CHANGES<<  Visit of high complexity given patient's limited communication ability and intellectual disability, and the resulting need for the presence of a third party (family) to provide clinical information and history.     #Medication management  -Discontinue OTC bisacodyl   Start Lactulose 15 ml scheduled once daily with an [id number] as prn  -Will renew Ativan 1mg occasionally used occasionally for PRN severe agitation.     -Continue:   --Risperidone 2mg bid  --Risperidone 2mg qday prn for agitation/psychosis   --Benztropine 1mg bid   --Fluoxetine 10mg daily   --Melatonin 3mg qhs   --Valium PRN for labs  --Docusate 100 mg bid      #Medication Considerations  -Medication consent/assent:   Risks, benefits, alternatives, off-label uses, black box warnings, and frequent/important side effects of medications have been discussed with patient/caregiver. To the extent possible, they have voiced understanding and agreement with recommended use of psychotropic medication.     -Medical necessity for continued treatment with psychotropic medication:      [] Symptom reduction        [] Improvement in functioning      [x] Reduce risk of harm to self/others      [x] Maintenance therapy to prevent deterioration in functioning      -Rational for not reducing medication dose at this time:           [x] Significant risk of deterioration in functioning       [x] Concern for  elevating risk of harm to self/others      [] Prior dose reduction unsuccessful and/or harmful      [] Psychiatric/behavioral condition not adequately stabilized/optimized       [] Medication regimen recently modified          -OARRS  --I have personally reviewed patient's OARRS report. I have considered the risks of abuse, dependence, addiction, and diversion and feel that the potential benefits of treatment with a controlled substance currently outweigh the potential risks.      -Risk/Benefit assessment:  There is no report of signs/symptoms consistent with medication-induced impairment in daily functioning. At this time, benefits of medication felt to outweigh potential risks. But we will continue to reassess need for psychotropic medication at regular 3 to 6 month intervals, or sooner as clinically indicated.         #Medication Monitoring Plan:    -Labs due May 2024; orders placed   -Labs to monitor: CBC, CMP, TSH/T4, lipid panel, Hgb A1c       #MDM/Complexity Issues    [] Chronic medical comorbidities     [x] Chronic risk of harm to self/others     [x] Intellectual/Developmental disability     [x] Need for independent historian due to intellectual/developmental disability and/or           impaired communication abilities     [x] Controlled substance medication requiring regular monitoring     [x] Medication requiring significant ongoing monitoring for toxicity     #Counseling Provided     [x] Diagnostic impression/prognosis      [x] Risks and benefits of treatment options     [x] Instruction for management/treatment and follow-up     [x] Educated patient/caregiver about: behavioral interventions, sleep hygiene, safety planning                  #Coordination of care provided    [x] Family    [] Caregiver/DSP/Staff       []Agency supervisor       [] Nursing staff      [] SSA/          []Therapist                     [] Guardian       #Follow-up      -in about 4 to 6 weeks, or sooner if new/worsening  symptoms         >> Scheduled virtual Follow-up Appt on 12/23/2024 at 14:00     Time  -Prep time on date of the patient encounter: 10 minutes  -Time spent directly with patient/family/caregiver: 45 minutes  -Additional time spent on patient care activities: 10 minutes  -Documentation time: 10 minutes  -Total time on date of patient encounter: 75 minutes        Scribe Attestation  By signing my name below, I, Rosalee Ambriz, David   attest that this documentation has been prepared under the direction and in the presence of Bonita Freedman DO.

## 2024-11-13 NOTE — PATIENT INSTRUCTIONS
AFTER VISIT SUMMARY      Date: 11/12/2024  Appointment with Psychiatrist - Dr. Freedman      Reason for Visit:  -Routine follow-up/Medication management      Discussed during Appointment:   -Physical health, psychiatric/behavioral symptoms, daily functioning, new issues/concerns     -Treatment plan/management, including medication        Clinical Impression/Status Update:  Report of persistent constipation. Recommended strategies such as drinking more fluids including flavored drinks. Mother reports she wants patient to attempt stooling at 6 pm everyday. Recommended patient sitting on the toilet bowl for at least 15 minutes. Patient to take lactulose 15 ml in the morning and to continue docusate twice daily to see if it helps. Patient to discontinue bisacodyl for now. Encouraged her to download the happy poop anton to track.  Mother notes that he has been having frequent outbursts lately. Could be due to constipation. Mother reports that she does not know his triggers.  Patient due for labs. Labs have been ordered. Will continue to remind caregiver to get labs done.   --We will continue current medications and see him again in 1 month.      #Clinic Policies/Procedures  -Refills  Prescriptions will be sent to your pharmacy with enough refills to last until your next appointment. For established patients, we typically provide 6 refills for regular meds and 3 refills for controlled substances (e.g., ADHD stimulant medication, benzodiazepines such as lorazepam). We will not provide additional refills beyond that without having an appointment. You can schedule an appointment by sending a [employer] message or calling our office at [phone].     -Paperwork Requests (e.g., Expert Eval for guardianship)  We ask that you please schedule an appointment if needing paperwork filled out by the doctor (e.g., Expert Eval, FMLA form).  Please provide as much information as possible about your request and email the doctor any forms  needing to be filled out prior to the appointment.       ===========================  INSTRUCTIONS/RECOMMENDATIONS  ===========================    #Medication   -Medication changes made today:   --We are starting you on lactulose 15 ml daily  --We are restarting you on Ativan 1 mg for severe agitation. Take half a tablet, if ineffective after 45 minutes, use another half.    --Discontinue bisacodyl    >>For prior authorization issues at the pharmacy -> Call the office and ask to talk to Nurse Reyes.         [employer] - Online Patient Portal   For Help activating your [employer] Account or setting up Proxy Access, please call the dedicated Patient Support Line at [phone].    If having technical Issues with Epic  or [employer]-> Please help us improve the Epic experience  To help identify issues needing to be fixed and improve patient care, please report any issues you experience with Epic or  [employer], such as difficulty connecting to video during virtual visits.  [phone]      #Follow-up  --Your next appointment is scheduled for 12/23/2024 at 2:00 pm (virtual visit with [facility])    *If having new/worsening symptoms, please send a [employer] message or call the clinic ([phone]) to discuss being seen sooner   >>If unable to reach the office, send me an email at [email or web address]

## 2024-11-14 PROBLEM — G24.02 DRUG-INDUCED DYSTONIA: Status: ACTIVE | Noted: 2024-11-14

## 2024-11-14 PROBLEM — T50.905A DRUG-INDUCED EXTRAPYRAMIDAL MOVEMENT DISORDER: Status: ACTIVE | Noted: 2024-11-14

## 2024-11-14 PROBLEM — G25.89 DRUG-INDUCED EXTRAPYRAMIDAL MOVEMENT DISORDER: Status: ACTIVE | Noted: 2024-11-14

## 2024-11-19 ENCOUNTER — TELEPHONE (OUTPATIENT)
Dept: BEHAVIORAL HEALTH | Facility: CLINIC | Age: 24
End: 2024-11-19
Payer: COMMERCIAL

## 2024-11-19 RX ORDER — LACTULOSE 10 G/15ML
SOLUTION ORAL
Qty: 450 ML | Refills: 6 | Status: CANCELLED | OUTPATIENT
Start: 2024-11-19

## 2024-11-19 RX ORDER — LORAZEPAM 1 MG/1
1 TABLET ORAL DAILY PRN
Status: CANCELLED | OUTPATIENT
Start: 2024-11-19

## 2024-11-20 RX ORDER — LORAZEPAM 1 MG/1
TABLET ORAL
Qty: 40 TABLET | Refills: 1 | Status: SHIPPED | OUTPATIENT
Start: 2024-11-20

## 2024-11-20 RX ORDER — LACTULOSE 10 G/15ML
SOLUTION ORAL
Qty: 946 ML | Refills: 6 | Status: SHIPPED | OUTPATIENT
Start: 2024-11-20

## 2024-11-20 RX ORDER — RISPERIDONE 2 MG/1
TABLET ORAL
Qty: 90 TABLET | Refills: 6 | Status: SHIPPED | OUTPATIENT
Start: 2024-11-20

## 2024-11-20 RX ORDER — FLUOXETINE 10 MG/1
TABLET ORAL
Qty: 30 TABLET | Refills: 6 | Status: SHIPPED | OUTPATIENT
Start: 2024-11-20

## 2024-11-20 RX ORDER — TALC
POWDER (GRAM) TOPICAL
Qty: 30 TABLET | Refills: 6 | Status: SHIPPED | OUTPATIENT
Start: 2024-11-20

## 2024-11-20 RX ORDER — DOCUSATE SODIUM 100 MG/1
CAPSULE, LIQUID FILLED ORAL
Qty: 60 CAPSULE | Refills: 6 | Status: SHIPPED | OUTPATIENT
Start: 2024-11-20

## 2024-11-20 RX ORDER — BENZTROPINE MESYLATE 1 MG/1
TABLET ORAL
Qty: 60 TABLET | Refills: 6 | Status: SHIPPED | OUTPATIENT
Start: 2024-11-20

## 2024-11-20 RX ORDER — LACTULOSE 10 G/15ML
SOLUTION ORAL
Qty: 946 ML | Refills: 6 | Status: CANCELLED | OUTPATIENT
Start: 2024-11-20

## 2024-12-23 ENCOUNTER — APPOINTMENT (OUTPATIENT)
Dept: BEHAVIORAL HEALTH | Facility: CLINIC | Age: 24
End: 2024-12-23
Payer: COMMERCIAL

## 2025-01-09 ENCOUNTER — APPOINTMENT (OUTPATIENT)
Dept: BEHAVIORAL HEALTH | Facility: CLINIC | Age: 25
End: 2025-01-09
Payer: COMMERCIAL

## 2025-01-09 DIAGNOSIS — Z79.899 ASSESSMENT OF EFFECTS OF PSYCHOTROPIC DRUG IN PATIENT AT RISK FOR METABOLIC SYNDROME: ICD-10-CM

## 2025-01-09 DIAGNOSIS — F29 PSYCHOSIS, ATYPICAL (MULTI): ICD-10-CM

## 2025-01-09 DIAGNOSIS — Z86.59 PSYCHIATRIC FOLLOW-UP: ICD-10-CM

## 2025-01-09 DIAGNOSIS — G25.89 DRUG-INDUCED EXTRAPYRAMIDAL MOVEMENT DISORDER: ICD-10-CM

## 2025-01-09 DIAGNOSIS — Z01.89 ASSESSMENT OF EFFECTS OF PSYCHOTROPIC DRUG: ICD-10-CM

## 2025-01-09 DIAGNOSIS — Z09 PSYCHIATRIC FOLLOW-UP: ICD-10-CM

## 2025-01-09 DIAGNOSIS — Z01.89 ASSESSMENT OF EFFECTS OF PSYCHOTROPIC DRUG IN PATIENT AT RISK FOR METABOLIC SYNDROME: ICD-10-CM

## 2025-01-09 DIAGNOSIS — Z91.89 AT RISK FOR SIDE EFFECT OF MEDICATION: ICD-10-CM

## 2025-01-09 DIAGNOSIS — K59.09 CHRONIC CONSTIPATION: ICD-10-CM

## 2025-01-09 DIAGNOSIS — F79 INTELLECTUAL DISABILITY: ICD-10-CM

## 2025-01-09 DIAGNOSIS — F63.9 IMPULSE CONTROL DISORDER: ICD-10-CM

## 2025-01-09 DIAGNOSIS — F51.05 INSOMNIA RELATED TO ANOTHER MENTAL DISORDER: ICD-10-CM

## 2025-01-09 DIAGNOSIS — Z79.899 ASSESSMENT OF EFFECTS OF PSYCHOTROPIC DRUG: ICD-10-CM

## 2025-01-09 DIAGNOSIS — F39 MOOD DISORDER (CMS-HCC): ICD-10-CM

## 2025-01-09 DIAGNOSIS — T50.905A DRUG-INDUCED EXTRAPYRAMIDAL MOVEMENT DISORDER: ICD-10-CM

## 2025-01-09 DIAGNOSIS — Q90.9 DOWN SYNDROME: ICD-10-CM

## 2025-01-09 PROCEDURE — 99215 OFFICE O/P EST HI 40 MIN: CPT | Performed by: STUDENT IN AN ORGANIZED HEALTH CARE EDUCATION/TRAINING PROGRAM

## 2025-01-09 RX ORDER — LACTULOSE 10 G/15ML
SOLUTION ORAL
Qty: 946 ML | Refills: 6 | Status: SHIPPED | OUTPATIENT
Start: 2025-01-09

## 2025-01-09 NOTE — PATIENT INSTRUCTIONS
AFTER VISIT SUMMARY      Date: 1/9/2025  Appointment with Psychiatrist - Dr. Freedman      Reason for Visit:  -Routine follow-up/Medication management      Discussed during Appointment:   -Physical health, psychiatric/behavioral symptoms, daily functioning, new issues/concerns     -Treatment plan/management, including medication        Clinical Impression/Status Update:  Reportedly doing better with behavior overall with improvement in constipation. Since he goes once every 3 days, will try to increase lactulose dose from 15 ml to 30 ml daily.   Patient due for labs. Labs have been ordered. Will continue to remind caregiver to get labs done.   --We will continue current medications and see him again in 1 month.      #Clinic Policies/Procedures  -Refills  Prescriptions will be sent to your pharmacy with enough refills to last until your next appointment. For established patients, we typically provide 6 refills for regular meds and 3 refills for controlled substances (e.g., ADHD stimulant medication, benzodiazepines such as lorazepam). We will not provide additional refills beyond that without having an appointment. You can schedule an appointment by sending a [employer] message or calling our office at [phone].     -Paperwork Requests (e.g., Expert Eval for guardianship)  We ask that you please schedule an appointment if needing paperwork filled out by the doctor (e.g., Expert Eval, FMLA form).  Please provide as much information as possible about your request and email the doctor any forms needing to be filled out prior to the appointment.       ===========================  INSTRUCTIONS/RECOMMENDATIONS  ===========================    #Medication   -Medication changes made today:   --We are increasing your lactulose from 15 ml daily to 30 ml daily      >>For prior authorization issues at the pharmacy -> Call the office and ask to talk to Nurse Reyes.    #To-Do prior to Next Visit  >> You are due for your annual  bloodwork.   >> Please get bloodwork/labs done at least one week prior to your next appointment.    -Bloodwork is necessary to help us identify potential medication side effects and monitor your overall health.   -No appointment or paperwork necessary. But you must be fasting for 12 hours prior to getting your blood drawn.   -Find nearest  lab hours and location here: [email or web address]       [employer] - Online Patient Portal   For Help activating your [employer] Account or setting up Proxy Access, please call the dedicated Patient Support Line at [phone].      If having technical Issues with Epic or [employer]-> Please help us improve the Epic experience  To help identify issues needing to be fixed and improve patient care, please report any issues you experience with Epic or  [employer], such as difficulty connecting to video during virtual visits.  [phone]      #Follow-up  --Your next appointment is scheduled for 4/17/2025 at 4:00 pm (virtual visit with [facility])    *If having new/worsening symptoms, please send a [employer] message or call the clinic ([phone]) to discuss being seen sooner   >>If unable to reach the office, send me an email at [email or web address]

## 2025-01-09 NOTE — PROGRESS NOTES
Others Attending Appointment:  -Mother  *Presence necessary as independent historian given patient's intellectual/developmental disability and/or impaired communication abilities      LAST INTERVENTION  Last seen about 2 months ago in November 2024. Report of ongoing chronic constipation. Patient was started to lactulose 15 ml scheduled once daily with an additional 15 ml as prn. OTC bisacodyl was discontinued. Ativan 1 mg was renewed for severe agitation when needed.        HISTORY OF PRESENT ILLNESS  #Interval Change  -Report of improvement in constipation        #Behavior  Stable. Report of occasional challenging behavior but not worse than usual.  There is no report of physical aggression, property destruction, elopement, or self-injurious behavior since last visit.   Recall from prior: Report of some behavioral outbursts and talking to imaginary people. Mother reports that she knows when patient is about to have an outburst because his lips will be moving with no sound. Mother reports that he is not physically aggressive.  Recall from prior: Report of behavioral outbursts at day program than at home. Patient reportedly yelling, cussing and throwing stuff. Mother reports triggers include being told no or told what to do. An example is being told to go take a shower or go to bed.  -Recall from prior: patient occasionally yells and cusses at mother. No more than a couple times per month. Mother reports belief that listening to music exacerbates patient's yelling at unseen things.       #Psychosis  Stable. As prior, occasionally stimulated but still not daily or weekly. No evidence suggestive of delusions.  Recall from prior: Rarely appears internally stimulated. Not daily or even weekly. As prior, patient does not endorse hallucinations. There is no evidence suggestive of delusions.       #Constipation  Report of improvement in constipation. Mother reports that she has to double the lactulose dose (from 15 ml to  "30 ml) every 3 days before patient stools usually within one hour.      #Medication  -Mother reports that she finds 30 ml of lactulose more effective.   -No report of significant change in frequency of use of PRNs for behaviors/agitation  -No report of significant change in effectiveness of PRNs for mitigating target behavior  -Ativan has reportedly been administered only 3 times since last visit.   -Endorses medication adherence.  -Denies side effects other than baseline constipation  -No report of signs/symptoms suggestive of oculogyric crisis since last visit; has been taking cogentin as a prophylaxis.  -No report of new or significant/bothersome medication side effects.   -Chronic constipation discussed below  *Mother administers Dulcolax pills (3 pills) as needed. Patient reportedly throws up within 30 minutes. Docusate (100 mg) is given twice a day.      #Health  No report of ED visits or hospitalizations since last visit.   No report of changes or issues with sleep or appetite.   Recall from prior: Report of constipation. Mother reports that he defecates once every week and she would want him \"to go\" every morning. Mother reports that patient gets the stool softeners twice a day.  Recall from prior: Mother reports concern for weight gain though unclear if actual gain and if so how much  --Height: 5 ft. Weight: 125 pounds (5/20/2024)  -constipation which is reported to be same as usual. Mother reports that patient throws up about 5 or 10 minutes after she administers Ducolax tablets (about 80% of the time). Mother reported that she tried administering it before breakfast but that didn't help him either. Patient gets the stool softener (docusate) every night and ducolax on Wednesday and Saturday mornings around 8 am and ready to pass stool by the time he gets back from day program. Patient doesn't vomit after taking the stool softener. Other laxatives medications that have been used in the past include " miralax.      REVIEW OF SYMPTOMS  -Depression: sometimes irritable   -Anxiety: negative  -ADHD: negative  -ASD: negative  -Psychosis: as per HPI  -ODD: negative  -OCD: negative  -Veda: negative  -Aggression: as per HPI  -SIB: as per HPI  -Sexually inappropriate behaviors: Will often pat females on the buttocks or the waist. Masturbates only in his room.   -Sleep: No issues reported.   -Appetite: No issues reported. No report of pica.   -Medical ROS: does not endorse difficulty urinating, seizures, rash, or polydipsia. +Constipation  *All other systems have been reviewed and are negative for complaint unless otherwise noted above         PAST PSYCHIATRIC HISTORY  -Prior diagnoses: Down Syndrome  -Was previously seeing someone at [facility] in June 2020; prior to that did not get psych care  -No report of prior psych hospitalizations  -History of violence: hitting, pushing, shoving, property destruction  -No reported history of self-injurious behavior  -History of self-injurious behavior: used to hit self in the head years ago  -No reported history of suicide attempt  -Previous psych meds: Abilify (not effective)    SOCIAL HISTORY:   -Lives with mother, father, aunt  -Has 7 siblings, all adult  -Family: significant involvement  -Care needs: 24/7 supervision, has an aide in the evenings to help family, assistance with ADLs, assistance with iADLS  -Work/School: graduated from high school. Goes to a vocational training center daily.  -Receives Board of Developmental Disabilities services in [facility] [place]  -Guardianship: own guardian   -Language/Communication: Poor verbal fluency, vocalizes with unclear meaning, limited vocabulary, poor language comprehension  -Cognitive abilities: No history of formal testing. Cannot read or write. No money management abilities.  -Does not endorse smoking, ETOH, or illicit drug use. No history of past substance abuse.  -No reported history of significant trauma    -No reported access to firearms     PAST MEDICAL HISTORY:   -No reported history of seizures  -No reported history of cardiac issues  -Prenatal exposure to crack cocaine  -Chronic constipation  -Down Syndrome  -Nystagmus  -PCP: Joe Keith MD  -Dentist: [facility]       FAMILY HISTORY  -Patient is adopted from his aunt  -Older brother with schizophrenia vs drug-induced 2/2 crack cocaine  -No reported family history of suicide  -Mother with substance abuse issues      Mental Status Exam:     Appearance: adequate grooming, dysmorphic features consistent with Down Syndrome  Build: average  Eye Contact: average, bilateral nystagmus as prior.   Motor Activity:  average without PMA/PMR.   Musculoskeletal: No TD, tics, or tremor appreciated. AIMS score 0.   Demeanor: average  Mood: euthymic  Affect: restricted  Speech: limited vocab, slow, slurred, hypophonic speech.  Thought Process: [place]. Generally goal directed. Associations are logical.  Thought Content: Does not endorse suicidal or homicidal ideation, no delusions elicited.   Thought Perception: Does not endorse auditory or visual hallucinations. Does not appear to be responding to hallucinatory stimuli  Orientation: alert, oriented to self/situation  Attention/Concentration: normal  Cognition: moderate to severe intellectual disability  Insight: impaired, in setting of intellectual/developmental disability  Judgement: impaired, in setting of intellectual/developmental disability       RISK ASSESSMENT   Patient is currently felt to be at low acute and imminent risk of harm to self and others. However, chronic risk is elevated given history of violence including aggression towards others. Patient does not currently meet criteria for inpatient psychiatric hospitalization given that he does not exhibit evidence of significant deterioration in baseline psychiatric illness, aggression, self-injury, and ability to care for self. There is no evidence that patient's  current caregivers/living environment are unable to safely manage patient's behavior. Overall risk mitigated by continued psychiatric follow-up care, psychopharmacologic intervention, 24/7 supervision, and [facility] services. Mother aware of emergency psychiatric resources available in the event of an acute psychiatric emergency, Mobile Crisis, 911, and local ER.      ===========================  IMPRESSION  Male with Down syndrome who initially presented for new eval of angry outbursts.     Initial Eval:  Patient's outbursts do not appear to be behaviorally driven and there does not seem to be significant irritability involved. The setting in which outbursts occur, when talking out loud to unseen persons (often while alone) is concerning for a psychotic disorder and patient does have a family history of schizophrenia. Will start by increasing Risperidone to target possible psychosis. Mother has been using prn Ativan prescribed by the ED. Instead, will encourage use of Risperidone prn as the outbursts seem to occur in the setting of psychosis. I will provide a limited amount of Ativan for emergencies if Risperidone doesn't work.       ###      As of this appointment:  -Overall psychiatric and behavioral condition improved since last visit.since last visit.   --Behavior reportedly continues to improve as constipation has been getting better.   -Constipation improved since starting Lactulose but still not having daily BMs. Will try increasing Lactulose.   -No report of significant new issues/concerns.   -Currently overdue for yearly monitoring labs. Reminded patient/caregiver of need to get labs done before next appt  -Appears to be tolerating medication regimen without report of significant or bothersome side effects.   -Will continue current meds (other than lactulose) and plan for follow-up in about 3 months  ###            Diagnoses:  -Unspecified psychotic disorder, r/o Schizophrenia  -Chronic  constipation  -Sleep Disorder  -Mood disorder  -Down Syndrome  -Intellectual Disability      PLAN / MANAGEMENT / RECOMMENDATIONS       >>MED CHANGES<<  Visit of high complexity given patient's limited communication ability and intellectual disability, and the resulting need for the presence of a third party (family) to provide clinical information and history.     #Medication management  -Increase Lactulose from 15 ml daily to 30 ml daily  -Continue:   --Risperidone 2 mg bid  --Risperidone 2 mg qday prn for agitation/psychosis   --Benztropine 1 mg bid   --Fluoxetine 10 mg daily   --Melatonin 3 mg qhs   --Ativan 1 mg PRN severe agitation  --Valium PRN for labs  --Docusate 100 mg bid          #Medication Considerations  -Medication consent/assent:   Risks, benefits, alternatives, off-label uses, black box warnings, and frequent/important side effects of medications have been discussed with patient/caregiver. To the extent possible, they have voiced understanding and agreement with recommended use of psychotropic medication.     -Medical necessity for continued treatment with psychotropic medication:      [] Symptom reduction        [] Improvement in functioning      [x] Reduce risk of harm to self/others      [x] Maintenance therapy to prevent deterioration in functioning      -Rational for not reducing medication dose at this time:           [x] Significant risk of deterioration in functioning       [x] Concern for elevating risk of harm to self/others      [] Prior dose reduction unsuccessful and/or harmful      [] Psychiatric/behavioral condition not adequately stabilized/optimized       [] Medication regimen recently modified          -OARRS  --I have personally reviewed patient's OARRS report. I have considered the risks of abuse, dependence, addiction, and diversion and feel that the potential benefits of treatment with a controlled substance currently outweigh the potential risks.      -Risk/Benefit  assessment:  There is no report of signs/symptoms consistent with medication-induced impairment in daily functioning. At this time, benefits of medication felt to outweigh potential risks. But we will continue to reassess need for psychotropic medication at regular 3 to 6 month intervals, or sooner as clinically indicated.         #Medication Monitoring Plan:    -Labs due May 2024; orders placed   -Labs to monitor: CBC, CMP, TSH/T4, lipid panel, Hgb A1c       #MDM/Complexity Issues    [x] Chronic medical comorbidities     [x] Chronic risk of harm to self/others     [x] Intellectual/Developmental disability     [x] Need for independent historian due to intellectual/developmental disability and/or           impaired communication abilities     [x] Controlled substance medication requiring regular monitoring     [x] Medication requiring significant ongoing monitoring for toxicity     #Counseling Provided     [x] Diagnostic impression/prognosis      [x] Risks and benefits of treatment options     [x] Instruction for management/treatment and follow-up     [x] Educated patient/caregiver about: behavioral interventions, sleep hygiene, safety planning                  #Coordination of care provided    [x] Family    [] Caregiver/DSP/Staff       []Agency supervisor       [] Nursing staff      [] SSA/          []Therapist                     [] Guardian       #Follow-up      -in about 4 to 6 weeks, or sooner if new/worsening symptoms         >> Scheduled virtual Follow-up Appt on 4/17/2025 at 16:00     Time  -Prep time on date of the patient encounter: 10 minutes  -Time spent directly with patient/family/caregiver: 45 minutes  -Additional time spent on patient care activities: 10 minutes  -Documentation time: 10 minutes  -Total time on date of patient encounter: 75 minutes        Scribe Attestation  By signing my name below, IRosalee, Scribe   attest that this documentation has been prepared under  the direction and in the presence of Bonita Freedman DO.

## 2025-01-14 RX ORDER — RISPERIDONE 2 MG/1
TABLET ORAL
Qty: 90 TABLET | Refills: 6 | Status: SHIPPED | OUTPATIENT
Start: 2025-01-14

## 2025-01-14 RX ORDER — TALC
POWDER (GRAM) TOPICAL
Qty: 30 TABLET | Refills: 6 | Status: SHIPPED | OUTPATIENT
Start: 2025-01-14

## 2025-01-14 RX ORDER — DOCUSATE SODIUM 100 MG/1
CAPSULE, LIQUID FILLED ORAL
Qty: 60 CAPSULE | Refills: 6 | Status: SHIPPED | OUTPATIENT
Start: 2025-01-14

## 2025-01-14 RX ORDER — BENZTROPINE MESYLATE 1 MG/1
TABLET ORAL
Qty: 60 TABLET | Refills: 6 | Status: SHIPPED | OUTPATIENT
Start: 2025-01-14

## 2025-01-14 RX ORDER — FLUOXETINE 10 MG/1
TABLET ORAL
Qty: 30 TABLET | Refills: 6 | Status: SHIPPED | OUTPATIENT
Start: 2025-01-14

## 2025-04-17 ENCOUNTER — APPOINTMENT (OUTPATIENT)
Dept: BEHAVIORAL HEALTH | Facility: CLINIC | Age: 25
End: 2025-04-17
Payer: COMMERCIAL

## 2025-04-28 ENCOUNTER — TELEPHONE (OUTPATIENT)
Dept: BEHAVIORAL HEALTH | Facility: CLINIC | Age: 25
End: 2025-04-28
Payer: COMMERCIAL

## 2025-04-28 DIAGNOSIS — Z09 PSYCHIATRIC FOLLOW-UP: ICD-10-CM

## 2025-04-28 DIAGNOSIS — Z86.59 PSYCHIATRIC FOLLOW-UP: ICD-10-CM

## 2025-05-05 ENCOUNTER — APPOINTMENT (OUTPATIENT)
Dept: BEHAVIORAL HEALTH | Facility: CLINIC | Age: 25
End: 2025-05-05
Payer: COMMERCIAL

## 2025-05-21 NOTE — PROGRESS NOTES
Others Attending Appointment:  -Mother  *Presence necessary as independent historian given patient's intellectual/developmental disability and/or impaired communication abilities        LAST INTERVENTION  Last seen about 3 months ago in January 2025. Report of improving behavior since improvement of constipation. Lactulose was increased from 15 ml daily to 30 ml daily.        HISTORY OF PRESENT ILLNESS    #Interval Change  -Previously reported constipation noted last appointment has improved.   -Patient described as being back to their psychiatric/behavioral baseline.  -New report of nocturnal enuresis. Otherwise, no report of significant new issues/concerns.                 #Behavior  -Improved  -There is no report of physical aggression, property destruction, elopement, or self-injurious behavior since last visit.   -Recall from prior: Report of some behavioral outbursts and talking to imaginary people. Mother reports that she knows when patient is about to have an outburst because his lips will be moving with no sound. Mother reports that he is not physically aggressive.  Recall from prior: Report of behavioral outbursts at day program than at home. Patient reportedly yelling, cussing and throwing stuff. Mother reports triggers include being told no or told what to do. An example is being told to go take a shower or go to bed.  -Recall from prior: patient occasionally yells and cusses at mother. No more than a couple times per month. Mother reports belief that listening to music exacerbates patient's yelling at unseen things.           #Psychosis  Stable. As prior, occasionally seems internally stimulated but not daily or even weekly. No evidence suggestive of delusions.          #Constipation  -Report of improvement in constipation.   --Mother reports he is passing stool approximately every other day. He continues to use lactulose (Constulose 10 g/15 mL) once daily, extra if he has not passed stool for >2 days,  "and stool softener (docusate sodium 100 mg) twice a day. Over the past two weeks, extra lactulose needed approximately 3 times       #Medication  -No report of significant change in frequency of use of PRNs for behaviors/agitation  -No report of significant change in effectiveness of PRNs for mitigating target behavior  -Ativan has reportedly been administered only 3 times since last visit.   -Endorses medication adherence.  -Denies side effects other than baseline constipation  -No report of signs/symptoms suggestive of oculogyric crisis since last visit; has been taking cogentin as a prophylaxis.  -No report of new or significant/bothersome medication side effects.   -Chronic constipation discussed above          #Health  -Mother reports he has been having more nocturnal enuresis. When it happens, he wakes to take a shower.   No report of ED visits or hospitalizations since last visit.   No report of changes or issues with sleep or appetite.   Recall from prior: Report of constipation. Mother reports that he defecates once every week and she would want him \"to go\" every morning. Mother reports that patient gets the stool softeners twice a day.  Recall from prior: Mother reports concern for weight gain though unclear if actual gain and if so how much  --Height: 5 ft. Weight: 125 pounds (5/20/2024)  -constipation which is reported to be same as usual. Mother reports that patient throws up about 5 or 10 minutes after she administers Ducolax tablets (about 80% of the time). Mother reported that she tried administering it before breakfast but that didn't help him either. Patient gets the stool softener (docusate) every night and ducolax on Wednesday and Saturday mornings around 8 am and ready to pass stool by the time he gets back from day program. Patient doesn't vomit after taking the stool softener. Other laxatives medications that have been used in the past include miralax.        REVIEW OF SYMPTOMS  -Depression: " sometimes irritable   -Anxiety: negative  -ADHD: negative  -ASD: negative  -Psychosis: as per HPI  -ODD: negative  -OCD: negative  -Veda: negative  -Aggression: as per HPI  -SIB: as per HPI  -Sexually inappropriate behaviors: Will often pat females on the buttocks or the waist. Masturbates only in his room.   -Sleep: No issues reported.   -Appetite: No issues reported. No report of pica.   -Medical ROS: does not endorse difficulty urinating, seizures, rash, or polydipsia. +Constipation  *All other systems have been reviewed and are negative for complaint unless otherwise noted above            PAST PSYCHIATRIC HISTORY  -Prior diagnoses: Down Syndrome  -Was previously seeing someone at [facility] in June 2020; prior to that did not get psych care  -No report of prior psych hospitalizations  -History of violence: hitting, pushing, shoving, property destruction  -No reported history of self-injurious behavior  -History of self-injurious behavior: used to hit self in the head years ago  -No reported history of suicide attempt  -Previous psych meds: Abilify (not effective)     SOCIAL HISTORY:   -Lives with mother, father, aunt  -Has 7 siblings, all adult  -Family: significant involvement  -Care needs: 24/7 supervision, has an aide in the evenings to help family, assistance with ADLs, assistance with iADLS  -Work/School: graduated from high school. Goes to a vocational training center daily.  -Receives Board of Developmental Disabilities services in [facility]  -Guardianship: own guardian   -Language/Communication: Poor verbal fluency, vocalizes with unclear meaning, limited vocabulary, poor language comprehension  -Cognitive abilities: No history of formal testing. Cannot read or write. No money management abilities.  -Does not endorse smoking, ETOH, or illicit drug use. No history of past substance abuse.  -No reported history of significant trauma   -No reported access to firearms      PAST  MEDICAL HISTORY:   -No reported history of seizures  -No reported history of cardiac issues  -Prenatal exposure to crack cocaine  -Chronic constipation  -Down Syndrome  -Nystagmus  -PCP: Joe Keith MD  -Dentist: [facility]        FAMILY HISTORY  -Patient is adopted from his aunt  -Older brother with schizophrenia vs drug-induced 2/2 crack cocaine  -No reported family history of suicide  -Mother with substance abuse issues        Mental Status Exam:     Appearance: adequate grooming, dysmorphic features consistent with Down Syndrome  Build: average  Eye Contact: average, bilateral nystagmus as prior.   Motor Activity:  average without PMA/PMR.   Musculoskeletal: No TD, tics, or tremor appreciated. AIMS score 0.   Demeanor: average  Mood: euthymic  Affect: restricted  Speech: limited vocab, slow, slurred, hypophonic speech.  Thought Process: Holden. Generally goal directed. Associations are logical.  Thought Content: Does not endorse suicidal or homicidal ideation, no delusions elicited.   Thought Perception: Does not endorse auditory or visual hallucinations. Does not appear to be responding to hallucinatory stimuli  Orientation: alert, oriented to self/situation  Attention/Concentration: normal  Cognition: moderate to severe intellectual disability  Insight: impaired, in setting of intellectual/developmental disability  Judgement: impaired, in setting of intellectual/developmental disability        RISK ASSESSMENT   Patient is currently felt to be at low acute and imminent risk of harm to self and others. However, chronic risk is elevated given history of violence including aggression towards others. Patient does not currently meet criteria for inpatient psychiatric hospitalization given that he does not exhibit evidence of significant deterioration in baseline psychiatric illness, aggression, self-injury, and ability to care for self. There is no evidence that patient's current caregivers/living environment are  unable to safely manage patient's behavior. Overall risk mitigated by continued psychiatric follow-up care, psychopharmacologic intervention, 24/7 supervision, and [facility] services. Mother aware of emergency psychiatric resources available in the event of an acute psychiatric emergency, Mobile Crisis, 911, and local ER.        ===========================  IMPRESSION  Male with Down syndrome who initially presented for new eval of angry outbursts.      Initial Eval:  Patient's outbursts do not appear to be behaviorally driven and there does not seem to be significant irritability involved. The setting in which outbursts occur, when talking out loud to unseen persons (often while alone) is concerning for a psychotic disorder and patient does have a family history of schizophrenia. Will start by increasing Risperidone to target possible psychosis. Mother has been using prn Ativan prescribed by the ED. Instead, will encourage use of Risperidone prn as the outbursts seem to occur in the setting of psychosis. I will provide a limited amount of Ativan for emergencies if Risperidone doesn't work.         ###      As of this appointment:  -Overall psychiatric and behavioral condition improved since last visit. Appears back to psychiatric/behavioral baseline.  -Previously reported constipation noted last appointment has improved.   -New report of nocturnal enuresis. No red flags but etiology not at this time so will monitor for now and recommended PCP follow-up. Otherwise, no report of significant new issues/concerns.  -Will continue current medication regimen and plan for follow-up in about 4-6 weeks.  ###               Diagnoses:  -Unspecified psychotic disorder, r/o Schizophrenia  -Chronic constipation  -Sleep Disorder  -Mood disorder  -Down Syndrome  -Intellectual Disability        PLAN / MANAGEMENT / RECOMMENDATIONS         Visit of high complexity given patient's limited communication ability and intellectual  disability, and the resulting need for the presence of a third party (family) to provide clinical information and history.      #Medication management  -Continue:   --Risperidone 2 mg bid  --Risperidone 2 mg qday prn for agitation/psychosis   --Benztropine 1 mg bid   --Fluoxetine 10 mg daily   --Melatonin 3 mg qhs   --Ativan 1 mg PRN severe agitation  --Valium PRN for labs  --Docusate 100 mg bid  --Lactulose 30 ml daily              #Medication Considerations  -Medication consent/assent:   Risks, benefits, alternatives, off-label uses, black box warnings, and frequent/important side effects of medications have been discussed with patient/caregiver. To the extent possible, they have voiced understanding and agreement with recommended use of psychotropic medication.      -Medical necessity for continued treatment with psychotropic medication:      [] Symptom reduction        [] Improvement in functioning      [x] Maintenance therapy to reduce risk of harm to self/others      [x] Maintenance therapy to prevent deterioration in functioning        -Rational for not reducing medication dose at this time:             [x] Significant risk of deterioration in functioning       [x] Concern for elevating risk of harm to self/others      [] Prior dose reduction unsuccessful and/or harmful      [] Psychiatric/behavioral condition not adequately stabilized/optimized       [] Medication regimen recently modified            -OARRS  --I have personally reviewed patient's OARRS report. I have considered the risks of abuse, dependence, addiction, and diversion and feel that the potential benefits of treatment with a controlled substance currently outweigh the potential risks.        -Risk/Benefit assessment:  There is no report of signs/symptoms consistent with medication-induced impairment in daily functioning. At this time, benefits of medication felt to outweigh potential risks. But we will continue to reassess need for  psychotropic medication at regular 3 to 6 month intervals, or sooner as clinically indicated.            #Medication Monitoring Plan:    -Labs due May 2024; orders placed   -Labs to monitor: CBC, CMP, TSH/T4, lipid panel, Hgb A1c         #MDM/Complexity Issues    [x] Chronic medical comorbidities     [x] Chronic risk of harm to self/others     [x] Intellectual/Developmental disability     [x] Need for independent historian due to intellectual/developmental disability and/or           impaired communication abilities     [x] Controlled substance medication requiring regular monitoring     [x] Medication requiring significant ongoing monitoring for toxicity      #Counseling Provided     [x] Diagnostic impression/prognosis      [x] Risks and benefits of treatment options     [x] Instruction for management/treatment and follow-up     [x] Educated patient/caregiver about: behavioral interventions, sleep hygiene, safety planning                  #Coordination of care provided    [x] Family    [] Caregiver/DSP/Staff       []Agency supervisor       [] Nursing staff      [] SSA/          []Therapist                     [] Guardian         #Follow-up      -in about 4 to 6 weeks, or sooner if new/worsening symptoms             Time  -Prep time on date of the patient encounter: 10 minutes  -Time spent directly with patient/family/caregiver: 45 minutes  -Additional time spent on patient care activities: 10 minutes  -Documentation time: 10 minutes  -Total time on date of patient encounter: 75 minutes             Scribe Attestation  By signing my name below, I, David Vo, attest that this documentation has been prepared under the direction and in the presence of Bonita Freedman DO.

## 2025-05-23 ENCOUNTER — APPOINTMENT (OUTPATIENT)
Dept: BEHAVIORAL HEALTH | Facility: CLINIC | Age: 25
End: 2025-05-23
Payer: COMMERCIAL

## 2025-05-23 DIAGNOSIS — Z91.89 AT RISK FOR SIDE EFFECT OF MEDICATION: ICD-10-CM

## 2025-05-23 DIAGNOSIS — K59.09 CHRONIC CONSTIPATION: ICD-10-CM

## 2025-05-23 DIAGNOSIS — Z09 PSYCHIATRIC FOLLOW-UP: ICD-10-CM

## 2025-05-23 DIAGNOSIS — F79 INTELLECTUAL DISABILITY: ICD-10-CM

## 2025-05-23 DIAGNOSIS — N39.44 NOCTURNAL ENURESIS: ICD-10-CM

## 2025-05-23 DIAGNOSIS — F63.9 IMPULSE CONTROL DISORDER: ICD-10-CM

## 2025-05-23 DIAGNOSIS — T88.7XXA MEDICATION SIDE EFFECTS: ICD-10-CM

## 2025-05-23 DIAGNOSIS — F39 MOOD DISORDER: ICD-10-CM

## 2025-05-23 DIAGNOSIS — F28 OTHER PSYCHOTIC DISORDER NOT DUE TO A SUBSTANCE OR KNOWN PHYSIOLOGICAL CONDITION (MULTI): ICD-10-CM

## 2025-05-23 DIAGNOSIS — F51.05 INSOMNIA RELATED TO ANOTHER MENTAL DISORDER: ICD-10-CM

## 2025-05-23 DIAGNOSIS — Q90.9 DOWN SYNDROME: ICD-10-CM

## 2025-05-23 DIAGNOSIS — Z86.59 PSYCHIATRIC FOLLOW-UP: ICD-10-CM

## 2025-05-23 PROCEDURE — 99215 OFFICE O/P EST HI 40 MIN: CPT | Performed by: STUDENT IN AN ORGANIZED HEALTH CARE EDUCATION/TRAINING PROGRAM

## 2025-06-03 PROBLEM — F28 OTHER PSYCHOTIC DISORDER NOT DUE TO A SUBSTANCE OR KNOWN PHYSIOLOGICAL CONDITION (MULTI): Status: ACTIVE | Noted: 2024-05-29

## 2025-06-23 NOTE — PROGRESS NOTES
Not seen 04/17/2025   No Show for virtual appointment               Others Attending Appointment:  -Mother  *Presence necessary as independent historian given patient's intellectual/developmental disability and/or impaired communication abilities        LAST INTERVENTION  Last seen about 5 weeks ago in May 2025. Report of improved constipation. New report of nocturnal enuresis. Rcommended PCP follow-up. No medication changes.        HISTORY OF PRESENT ILLNESS    #Interval Change  info to include for all patients at all appointments  Note status of patient’s overall psych/behavioral condition today compared to last appt  -No report of significant change in patient's overall psychiatric and behavioral condition since last visit.  -Report of ongoing issues with _______ [problem/issues/symptoms, e.g. challenging behavior]  --described as about the same/improved/worse since last appointment  -Previously reported ________ [problem/issues/symptoms, e.g. increased anxiety] noted last appointment has resolved. Patient described as being back to their psychiatric/behavioral baseline.    Note if having new/ongoing issue. If none, then specify no new issues  -New report of issues with _______ [problem/issues/symptoms]  -Report of ongoing __________ [problem/issues/symptoms, e.g. constipation, sleep disruption]. Otherwise, no report of significant new issues/concerns.  -No report of significant new issues/concerns.    Extra info to include (when applicable)  Make note if monitoring labs are due. If labs were ordered during a previous appt, note if done or not.  -Currently due for yearly monitoring labs  -Previously ordered labs for yearly monitoring were [done/not done]    Make note if any recent ED visits/hospitalizations, recent medical appointments (besides routine follow-up), or if any medication changes since last appointment  -ED visit for/Hospitalized for [problem/issues, e.g. suspected UTI). Otherwise, no report of  significant new issues/concerns.  -Saw PCP/Saw Specialist for [medical issues/reason for visit]. Otherwise, no report of significant new issues/concerns.  -Medication changes since last appt: [e.g., started whatever for weight loss, neuro started whatever for seizures]        #Behavior    -There is no report of physical aggression, property destruction, elopement, or self-injurious behavior since last visit.   -Recall from prior: Report of some behavioral outbursts and talking to imaginary people. Mother reports that she knows when patient is about to have an outburst because his lips will be moving with no sound. Mother reports that he is not physically aggressive.        #Psychosis    Recall from prior: occasionally seems internally stimulated but not daily or even weekly. No evidence suggestive of delusions.          #Constipation    Recall from prior: Mother reports he is passing stool approximately every other day. He continues to use lactulose (Constulose 10 g/15 mL) once daily, extra if he has not passed stool for >2 days, and stool softener (docusate sodium 100 mg) twice a day. Over the past two weeks, extra lactulose needed approximately 3 times       #Medication      -No report of significant change in frequency of use of PRNs for behaviors/agitation  -No report of significant change in effectiveness of PRNs for mitigating target behavior  -Ativan has reportedly been administered only 3 times since last visit.   -Endorses medication adherence.  -Denies side effects other than baseline constipation  -No report of signs/symptoms suggestive of oculogyric crisis since last visit; has been taking cogentin as a prophylaxis.  -No report of new or significant/bothersome medication side effects.   -Chronic constipation discussed above          #Health      No report of ED visits or hospitalizations since last visit.   No report of changes or issues with sleep or appetite.   Recall from prior: Mother reports he has  been having more nocturnal enuresis. When it happens, he wakes to take a shower.           REVIEW OF SYMPTOMS  -Depression: sometimes irritable   -Anxiety: negative  -ADHD: negative  -ASD: negative  -Psychosis: as per HPI  -ODD: negative  -OCD: negative  -Veda: negative  -Aggression: as per HPI  -SIB: as per HPI  -Sexually inappropriate behaviors: Will often pat females on the buttocks or the waist. Masturbates only in his room.   -Sleep: No issues reported.   -Appetite: No issues reported. No report of pica.   -Medical ROS: does not endorse difficulty urinating, seizures, rash, or polydipsia. +Constipation  *All other systems have been reviewed and are negative for complaint unless otherwise noted above            PAST PSYCHIATRIC HISTORY  -Prior diagnoses: Down Syndrome  -Was previously seeing someone at [facility] in June 2020; prior to that did not get psych care  -No report of prior psych hospitalizations  -History of violence: hitting, pushing, shoving, property destruction  -No reported history of self-injurious behavior  -History of self-injurious behavior: used to hit self in the head years ago  -No reported history of suicide attempt  -Previous psych meds: Abilify (not effective)     SOCIAL HISTORY:   -Lives with mother, father, aunt  -Has 7 siblings, all adult  -Family: significant involvement  -Care needs: 24/7 supervision, has an aide in the evenings to help family, assistance with ADLs, assistance with iADLS  -Work/School: graduated from high school. Goes to a vocational training center daily.  -Receives Board of Developmental Disabilities services in [facility]  -Guardianship: own guardian   -Language/Communication: Poor verbal fluency, vocalizes with unclear meaning, limited vocabulary, poor language comprehension  -Cognitive abilities: No history of formal testing. Cannot read or write. No money management abilities.  -Does not endorse smoking, ETOH, or illicit drug use. No  history of past substance abuse.  -No reported history of significant trauma   -No reported access to firearms      PAST MEDICAL HISTORY:   -No reported history of seizures  -No reported history of cardiac issues  -Prenatal exposure to crack cocaine  -Chronic constipation  -Down Syndrome  -Nystagmus  -PCP: Joe Keith MD  -Dentist: [facility]        FAMILY HISTORY  -Patient is adopted from his aunt  -Older brother with schizophrenia vs drug-induced 2/2 crack cocaine  -No reported family history of suicide  -Mother with substance abuse issues        Mental Status Exam:     Appearance: adequate grooming, dysmorphic features consistent with Down Syndrome  Build: average  Eye Contact: average, bilateral nystagmus as prior.   Motor Activity:  average without PMA/PMR.   Musculoskeletal: No TD, tics, or tremor appreciated. AIMS score 0.   Demeanor: average  Mood: euthymic  Affect: restricted  Speech: limited vocab, slow, slurred, hypophonic speech.  Thought Process: [place]. Generally goal directed. Associations are logical.  Thought Content: Does not endorse suicidal or homicidal ideation, no delusions elicited.   Thought Perception: Does not endorse auditory or visual hallucinations. Does not appear to be responding to hallucinatory stimuli  Orientation: alert, oriented to self/situation  Attention/Concentration: normal  Cognition: moderate to severe intellectual disability  Insight: impaired, in setting of intellectual/developmental disability  Judgement: impaired, in setting of intellectual/developmental disability        RISK ASSESSMENT   Patient is currently felt to be at low acute and imminent risk of harm to self and others. However, chronic risk is elevated given history of violence including aggression towards others. Patient does not currently meet criteria for inpatient psychiatric hospitalization given that he does not exhibit evidence of significant deterioration in baseline psychiatric illness, aggression,  self-injury, and ability to care for self. There is no evidence that patient's current caregivers/living environment are unable to safely manage patient's behavior. Overall risk mitigated by continued psychiatric follow-up care, psychopharmacologic intervention, 24/7 supervision, and [facility] services. Mother aware of emergency psychiatric resources available in the event of an acute psychiatric emergency, Mobile Crisis, 911, and local ER.        ===========================  IMPRESSION  Male with Down syndrome who initially presented for new eval of angry outbursts.      Initial Eval:  Patient's outbursts do not appear to be behaviorally driven and there does not seem to be significant irritability involved. The setting in which outbursts occur, when talking out loud to unseen persons (often while alone) is concerning for a psychotic disorder and patient does have a family history of schizophrenia. Will start by increasing Risperidone to target possible psychosis. Mother has been using prn Ativan prescribed by the ED. Instead, will encourage use of Risperidone prn as the outbursts seem to occur in the setting of psychosis. I will provide a limited amount of Ativan for emergencies if Risperidone doesn't work.         ###      As of this appointment:            -Overall psychiatric and behavioral condition improved since last visit. Appears back to psychiatric/behavioral baseline.  -Previously reported constipation noted last appointment has improved.   -New report of nocturnal enuresis. No red flags but etiology not at this time so will monitor for now and recommended PCP follow-up. Otherwise, no report of significant new issues/concerns.  -Will continue current medication regimen and plan for follow-up in about 4-6 weeks.  ###               Diagnoses:  -Unspecified psychotic disorder, r/o Schizophrenia  -Chronic constipation  -Sleep Disorder  -Mood disorder  -Down Syndrome  -Intellectual Disability        PLAN /  MANAGEMENT / RECOMMENDATIONS         Visit of high complexity given patient's limited communication ability and intellectual disability, and the resulting need for the presence of a third party (family) to provide clinical information and history.      #Medication management  -Continue:   --Risperidone 2 mg bid  --Risperidone 2 mg qday prn for agitation/psychosis   --Benztropine 1 mg bid   --Fluoxetine 10 mg daily   --Melatonin 3 mg qhs   --Ativan 1 mg PRN severe agitation  --Valium PRN for labs  --Docusate 100 mg bid  --Lactulose 30 ml daily              #Medication Considerations  -Medication consent/assent:   Risks, benefits, alternatives, off-label uses, black box warnings, and frequent/important side effects of medications have been discussed with patient/caregiver. To the extent possible, they have voiced understanding and agreement with recommended use of psychotropic medication.      -Medical necessity for continued treatment with psychotropic medication:      [] Symptom reduction        [] Improvement in functioning      [x] Maintenance therapy to reduce risk of harm to self/others      [x] Maintenance therapy to prevent deterioration in functioning        -Rational for not reducing medication dose at this time:             [x] Significant risk of deterioration in functioning       [x] Concern for elevating risk of harm to self/others      [] Prior dose reduction unsuccessful and/or harmful      [] Psychiatric/behavioral condition not adequately stabilized/optimized       [] Medication regimen recently modified            -OARRS  --I have personally reviewed patient's OARRS report. I have considered the risks of abuse, dependence, addiction, and diversion and feel that the potential benefits of treatment with a controlled substance currently outweigh the potential risks.        -Risk/Benefit assessment:  There is no report of signs/symptoms consistent with medication-induced impairment in daily functioning.  At this time, benefits of medication felt to outweigh potential risks. But we will continue to reassess need for psychotropic medication at regular 3 to 6 month intervals, or sooner as clinically indicated.            #Medication Monitoring Plan:    -Labs due May 2024; orders placed   -Labs to monitor: CBC, CMP, TSH/T4, lipid panel, Hgb A1c         #MDM/Complexity Issues    [x] Chronic medical comorbidities     [x] Chronic risk of harm to self/others     [x] Intellectual/Developmental disability     [x] Need for independent historian due to intellectual/developmental disability and/or           impaired communication abilities     [x] Controlled substance medication requiring regular monitoring     [x] Medication requiring significant ongoing monitoring for toxicity      #Counseling Provided     [x] Diagnostic impression/prognosis      [x] Risks and benefits of treatment options     [x] Instruction for management/treatment and follow-up     [x] Educated patient/caregiver about: behavioral interventions, sleep hygiene, safety planning                  #Coordination of care provided    [x] Family    [] Caregiver/DSP/Staff       []Agency supervisor       [] Nursing staff      [] SSA/          []Therapist                     [] Guardian         #Follow-up      -in about 4 to 6 weeks, or sooner if new/worsening symptoms    >>> Scheduled virtual Follow-up Appt on ____________  at _________.         Time  -Prep time on date of the patient encounter: 10 minutes  -Time spent directly with patient/family/caregiver: 45 minutes  -Additional time spent on patient care activities: 10 minutes  -Documentation time: 10 minutes  -Total time on date of patient encounter: 75 minutes             Scribe Attestation  By signing my name below, IRosalee, Scribe   attest that this documentation has been prepared under the direction and in the presence of Bonita Freedman DO.

## 2025-06-27 ENCOUNTER — APPOINTMENT (OUTPATIENT)
Dept: BEHAVIORAL HEALTH | Facility: CLINIC | Age: 25
End: 2025-06-27
Payer: COMMERCIAL

## 2025-06-27 ENCOUNTER — HOSPITAL ENCOUNTER (EMERGENCY)
Facility: HOSPITAL | Age: 25
Discharge: HOME | End: 2025-06-27
Payer: COMMERCIAL

## 2025-06-27 ENCOUNTER — APPOINTMENT (OUTPATIENT)
Dept: RADIOLOGY | Facility: HOSPITAL | Age: 25
End: 2025-06-27
Payer: COMMERCIAL

## 2025-06-27 VITALS
RESPIRATION RATE: 16 BRPM | HEIGHT: 61 IN | HEART RATE: 84 BPM | WEIGHT: 160 LBS | TEMPERATURE: 98.1 F | BODY MASS INDEX: 30.21 KG/M2 | OXYGEN SATURATION: 97 % | DIASTOLIC BLOOD PRESSURE: 72 MMHG | SYSTOLIC BLOOD PRESSURE: 117 MMHG

## 2025-06-27 DIAGNOSIS — J18.9 PNEUMONIA OF RIGHT LUNG DUE TO INFECTIOUS ORGANISM, UNSPECIFIED PART OF LUNG: Primary | ICD-10-CM

## 2025-06-27 LAB
FLUAV RNA RESP QL NAA+PROBE: NOT DETECTED
FLUBV RNA RESP QL NAA+PROBE: NOT DETECTED
RSV RNA RESP QL NAA+PROBE: NOT DETECTED
SARS-COV-2 RNA RESP QL NAA+PROBE: NOT DETECTED

## 2025-06-27 PROCEDURE — 99284 EMERGENCY DEPT VISIT MOD MDM: CPT | Performed by: PHYSICIAN ASSISTANT

## 2025-06-27 PROCEDURE — 71046 X-RAY EXAM CHEST 2 VIEWS: CPT | Performed by: STUDENT IN AN ORGANIZED HEALTH CARE EDUCATION/TRAINING PROGRAM

## 2025-06-27 PROCEDURE — 71046 X-RAY EXAM CHEST 2 VIEWS: CPT

## 2025-06-27 PROCEDURE — 87637 SARSCOV2&INF A&B&RSV AMP PRB: CPT | Performed by: PHYSICIAN ASSISTANT

## 2025-06-27 PROCEDURE — 99284 EMERGENCY DEPT VISIT MOD MDM: CPT | Mod: 25

## 2025-06-27 PROCEDURE — 2500000001 HC RX 250 WO HCPCS SELF ADMINISTERED DRUGS (ALT 637 FOR MEDICARE OP): Mod: SE | Performed by: PHYSICIAN ASSISTANT

## 2025-06-27 RX ORDER — AZITHROMYCIN 500 MG/1
500 TABLET, FILM COATED ORAL ONCE
Status: COMPLETED | OUTPATIENT
Start: 2025-06-27 | End: 2025-06-27

## 2025-06-27 RX ORDER — AMOXICILLIN 500 MG/1
1000 TABLET, FILM COATED ORAL EVERY 8 HOURS SCHEDULED
Qty: 30 TABLET | Refills: 0 | Status: SHIPPED | OUTPATIENT
Start: 2025-06-27 | End: 2025-07-02

## 2025-06-27 RX ORDER — AMOXICILLIN 250 MG/1
1000 CAPSULE ORAL ONCE
Status: COMPLETED | OUTPATIENT
Start: 2025-06-27 | End: 2025-06-27

## 2025-06-27 RX ORDER — IBUPROFEN 600 MG/1
600 TABLET, FILM COATED ORAL ONCE
Status: COMPLETED | OUTPATIENT
Start: 2025-06-27 | End: 2025-06-27

## 2025-06-27 RX ORDER — AZITHROMYCIN 250 MG/1
TABLET, FILM COATED ORAL
Qty: 6 TABLET | Refills: 0 | Status: SHIPPED | OUTPATIENT
Start: 2025-06-27 | End: 2025-07-02

## 2025-06-27 RX ADMIN — AZITHROMYCIN DIHYDRATE 500 MG: 500 TABLET ORAL at 22:49

## 2025-06-27 RX ADMIN — IBUPROFEN 600 MG: 600 TABLET ORAL at 22:49

## 2025-06-27 RX ADMIN — AMOXICILLIN 1000 MG: 250 CAPSULE ORAL at 22:49

## 2025-06-27 ASSESSMENT — LIFESTYLE VARIABLES
HAVE PEOPLE ANNOYED YOU BY CRITICIZING YOUR DRINKING: NO
EVER FELT BAD OR GUILTY ABOUT YOUR DRINKING: NO
HAVE YOU EVER FELT YOU SHOULD CUT DOWN ON YOUR DRINKING: NO
EVER HAD A DRINK FIRST THING IN THE MORNING TO STEADY YOUR NERVES TO GET RID OF A HANGOVER: NO
TOTAL SCORE: 0

## 2025-06-27 NOTE — ED TRIAGE NOTES
Pt has had a productive cough for 1 day. Family denies fever chills, but one episode of vomiting. Pt has Downs, but is at his baseline.

## 2025-06-28 NOTE — ED PROVIDER NOTES
"Emergency Department Encounter  [facility] EMERGENCY MEDICINE    Patient: Portillo Islas  MRN: [id number]  : 2000  Date of Evaluation: 2025  ED Provider: Windy Brand PA-C        History of Present Illness     This is a 25-year-old male with past medical history of Down syndrome who presents to the ED with his mother for 1 day of cough.  Patient's mother states that he had 2 episodes of posttussive emesis today and she was very concerned about this.  His cough is primarily dry but occasionally productive with clear sputum.  He has not complained of any shortness of breath.  He does endorse feeling generally unwell.  No reported fevers or chills.  Patient denies shortness of breath but does endorse chest pain with coughing.  Denies abdominal pain.  He has been able to tolerate p.o. intake.  He denies any other complaints.      History provided by:  Patient   used: No             Visit Vitals  /75 (BP Location: Right arm, Patient Position: Sitting)   Pulse 96   Temp 36.7 °C (98.1 °F) (Temporal)   Resp 18   Ht (!) 1.549 m (5' 1\")   Wt 72.6 kg (160 lb)   SpO2 96%   BMI 30.23 kg/m²   Smoking Status Never   BSA 1.77 m²          Physical Exam       Triage vitals:  T 36.7 °C (98.1 °F)  HR 96  /75  RR 18  O2 96 % None (Room air)    Physical Exam     Physical exam:   General: Vitals noted, no distress. Afebrile.   EENT:  Hearing grossly intact. Normal phonation. MMM. Airway patient. PERRL. EOMI. posterior oropharynx without erythema, edema, or exudate.  Uvula midline.  Neck: No midline tenderness or paraspinal tenderness. FROM.   Cardiac: Regular, rate, rhythm. Normal S1 and S2.  No murmurs, gallops, rubs.   Pulmonary: Good air exchange. Lungs clear bilaterally. No wheezes, rhonchi, rales. No accessory muscle use.   Abdomen: Soft, nonsurgical. Nontender. No peritoneal signs.   Back: No CVA tenderness. No midline tenderness or paraspinal tenderness. No obvious " deformity or step off.   Extremities: No peripheral edema.  Full range of motion. Moves all extremities freely. No tenderness throughout extremities.   Skin: No rash. Warm and Dry.   Neuro: No focal neurologic deficits. CN 2-12 grossly intact. Sensation equal bilaterally. No weakness.       Results       Labs Reviewed   SARS-COV-2, INFLUENZA A/B AND RSV PCR - Normal       Result Value    Coronavirus 2019, PCR Not Detected      Flu A Result Not Detected      Flu B Result Not Detected      RSV PCR Not Detected      Narrative:     This assay is an FDA-cleared, in vitro diagnostic nucleic acid amplification test for the qualitative detection and differentiation of SARS CoV-2/ Influenza A/B/ RSV from nasopharyngeal specimens collected from individuals with signs and symptoms of respiratory tract infections, and has been validated for use at [facility]. Negative results do not preclude COVID-19/ Influenza A/B/ RSV infections and should not be used as the sole basis for diagnosis, treatment, or other management decisions. Testing for SARS CoV-2 is recommended only for patients who meet current clinical and/or epidemiological criteria defined by federal, state, or local public health directives.       XR chest 2 views   Final Result   1. Low lung volumes with somewhat indistinct lung markings in   questionable infiltrate in the right middle lobe. Findings may   represent reactive/viral airways disease, although early pneumonia in   the right lower lobe is not entirely excluded. Correlate with   physical exam.        I personally reviewed the image(s)/study and resident interpretation   as stated by Dr. Cristal Hemphill MD. I agree with the findings as   stated. This study was interpreted at [facility], [place], [place].        MACRO:   None        Signed by: Balbina Simpson 6/27/2025 9:29 PM   Dictation workstation:   [id number]            Medical Decision  Making & ED Course         ED Course & MDM     Medical Decision Making  This is a 25-year-old male with past medical history of Down syndrome who presents to the ED with his mother for 1 day of cough with 2 episodes of posttussive emesis.  Vital stable upon arrival to the ED.  On examination patient is overall well-appearing.  He does not appear in respiratory distress.  Lungs clear bilaterally.  No audible cardiac murmurs.  Abdomen soft and nontender.  Posterior oropharynx without erythema, edema, or exudate.  Uvula midline.  Viral swab as well as chest x-ray ordered.  Viral swab negative.  Chest x-ray concerning for questionable infiltrate in the right middle lobe.  This was discussed with the patient and his family member.  Patient will be treated for pneumonia today due to his symptoms.  He was given a dose of azithromycin and amoxicillin here in the ED and was written prescriptions for azithromycin and amoxicillin to go home with.  Patient's mother agreeable to this plan.  She was advised to have him follow-up close with his pediatrician.  Patient was discharged from the ED in stable condition.         Diagnoses as of 06/27/25 2250   Pneumonia of right lung due to infectious organism, unspecified part of lung       Independent Result Review and Interpretation: Chest X-Ray as interpreted by me revealed consolidation to right middle lobe        Disposition   As a result of the work-up, the patient was discharged home.  he was informed of his diagnosis and instructed to come back with any concerns or worsening of condition.  he and was agreeable to the plan as discussed above.  he was given the opportunity to ask questions.  All of the patient's questions were answered.    Procedures     Patient was seen independently    Procedures    Windy Brand PA-C  Emergency Medicine      Windy Brand PA-C  06/27/25 [id number]

## 2025-07-16 ENCOUNTER — APPOINTMENT (OUTPATIENT)
Dept: BEHAVIORAL HEALTH | Facility: CLINIC | Age: 25
End: 2025-07-16
Payer: COMMERCIAL

## 2025-07-24 DIAGNOSIS — Z09 PSYCHIATRIC FOLLOW-UP: ICD-10-CM

## 2025-07-24 DIAGNOSIS — Z86.59 PSYCHIATRIC FOLLOW-UP: ICD-10-CM

## 2025-08-13 ENCOUNTER — APPOINTMENT (OUTPATIENT)
Dept: BEHAVIORAL HEALTH | Facility: CLINIC | Age: 25
End: 2025-08-13
Payer: COMMERCIAL

## 2025-10-13 ENCOUNTER — APPOINTMENT (OUTPATIENT)
Dept: BEHAVIORAL HEALTH | Facility: CLINIC | Age: 25
End: 2025-10-13
Payer: COMMERCIAL